# Patient Record
Sex: FEMALE | Race: OTHER
[De-identification: names, ages, dates, MRNs, and addresses within clinical notes are randomized per-mention and may not be internally consistent; named-entity substitution may affect disease eponyms.]

---

## 2017-03-31 ENCOUNTER — RECORD ABSTRACTING (OUTPATIENT)
Age: 37
End: 2017-03-31

## 2017-05-10 ENCOUNTER — APPOINTMENT (OUTPATIENT)
Dept: VASCULAR SURGERY | Facility: CLINIC | Age: 37
End: 2017-05-10

## 2017-05-10 VITALS
DIASTOLIC BLOOD PRESSURE: 68 MMHG | BODY MASS INDEX: 29.23 KG/M2 | SYSTOLIC BLOOD PRESSURE: 118 MMHG | WEIGHT: 145 LBS | HEIGHT: 59 IN

## 2017-06-08 ENCOUNTER — OUTPATIENT (OUTPATIENT)
Dept: OUTPATIENT SERVICES | Facility: HOSPITAL | Age: 37
LOS: 1 days | Discharge: HOME | End: 2017-06-08

## 2017-06-08 ENCOUNTER — APPOINTMENT (OUTPATIENT)
Dept: INTERNAL MEDICINE | Facility: CLINIC | Age: 37
End: 2017-06-08

## 2017-06-08 VITALS
HEIGHT: 59 IN | DIASTOLIC BLOOD PRESSURE: 75 MMHG | WEIGHT: 142 LBS | SYSTOLIC BLOOD PRESSURE: 122 MMHG | HEART RATE: 58 BPM | BODY MASS INDEX: 28.63 KG/M2 | RESPIRATION RATE: 17 BRPM

## 2017-06-08 RX ORDER — FLUCONAZOLE 150 MG/1
150 TABLET ORAL
Qty: 5 | Refills: 0 | Status: DISCONTINUED | COMMUNITY
Start: 2017-05-22

## 2017-06-13 LAB
ALBUMIN SERPL-MCNC: 4.6 G/DL
ALBUMIN/GLOB SERPL: 1.64
ALP SERPL-CCNC: 48 IU/L
ALT SERPL-CCNC: 21 IU/L
ANION GAP SERPL CALC-SCNC: 8 MEQ/L
APPEARANCE UR: CLEAR
AST SERPL-CCNC: 23 IU/L
BASOPHILS # BLD: 0.04 TH/MM3
BASOPHILS NFR BLD: 0.6 %
BILIRUB SERPL-MCNC: 1.1 MG/DL
BILIRUB UR QL STRIP: NEGATIVE
BUN SERPL-MCNC: 8 MG/DL
BUN/CREAT SERPL: 10.7 %
CALCIUM SERPL-MCNC: 9.4 MG/DL
CHLORIDE SERPL-SCNC: 103 MEQ/L
CHOLEST SERPL-MCNC: 212 MG/DL
CO2 SERPL-SCNC: 26 MEQ/L
COLOR UR: YELLOW
CREAT SERPL-MCNC: 0.75 MG/DL
DIFFERENTIAL METHOD BLD: NORMAL
EOSINOPHIL # BLD: 0.28 TH/MM3
EOSINOPHIL NFR BLD: 4.5 %
ERYTHROCYTE [DISTWIDTH] IN BLOOD BY AUTOMATED COUNT: 14.9 %
ESTIMATED AVERGAGE GLUCOSE (NORTH): 117 MG/DL
GFR SERPL CREATININE-BSD FRML MDRD: 88
GLUCOSE SERPL-MCNC: 74 MG/DL
GLUCOSE UR STRIP-MCNC: NEGATIVE MG/DL
GRANULOCYTES # BLD: 3.28 TH/MM3
GRANULOCYTES NFR BLD: 53.3 %
HBA1C MFR BLD: 5.7 %
HCT VFR BLD AUTO: 40 %
HDLC SERPL-MCNC: 67 MG/DL
HDLC SERPL: 3.16
HGB BLD-MCNC: 12.8 G/DL
HGB UR QL STRIP: NEGATIVE
IMM GRANULOCYTES # BLD: 0 TH/MM3
IMM GRANULOCYTES NFR BLD: 0 %
KETONES UR STRIP-MCNC: NEGATIVE MG/DL
LDLC SERPL DIRECT ASSAY-MCNC: 124 MG/DL
LYMPHOCYTES # BLD: 2.08 TH/MM3
LYMPHOCYTES NFR BLD: 33.7 %
MCH RBC QN AUTO: 28.4 PG
MCHC RBC AUTO-ENTMCNC: 32 G/DL
MCV RBC AUTO: 88.7 FL
MONOCYTES # BLD: 0.49 TH/MM3
MONOCYTES NFR BLD: 7.9 %
NITRITE UR QL STRIP: NEGATIVE
PH UR STRIP: 6.5
PLATELET # BLD: 222 TH/MM3
PMV BLD AUTO: 11.7 FL
POTASSIUM SERPL-SCNC: 4.2 MMOL/L
PROT SERPL-MCNC: 7.4 G/DL
PROT UR STRIP-MCNC: NEGATIVE MG/DL
RBC # BLD AUTO: 4.51 MIL/MM3
SODIUM SERPL-SCNC: 137 MEQ/L
SP GR UR STRIP: 1.01
TRIGL SERPL-MCNC: 57 MG/DL
TSH SERPL DL<=0.005 MIU/L-ACNC: 1.61 UIU/ML
UROBILINOGEN UR STRIP-MCNC: 0.2 MG/DL
VLDLC SERPL-MCNC: 11 MG/DL
WBC # BLD: 6.17 TH/MM3
WBC URNS QL MICRO: NEGATIVE

## 2017-06-26 ENCOUNTER — EMERGENCY (EMERGENCY)
Facility: HOSPITAL | Age: 37
LOS: 0 days | Discharge: HOME | End: 2017-06-27

## 2017-06-26 DIAGNOSIS — N89.8 OTHER SPECIFIED NONINFLAMMATORY DISORDERS OF VAGINA: ICD-10-CM

## 2017-06-28 DIAGNOSIS — Z00.01 ENCOUNTER FOR GENERAL ADULT MEDICAL EXAMINATION WITH ABNORMAL FINDINGS: ICD-10-CM

## 2017-06-28 DIAGNOSIS — R10.2 PELVIC AND PERINEAL PAIN: ICD-10-CM

## 2017-08-14 ENCOUNTER — APPOINTMENT (OUTPATIENT)
Dept: OBGYN | Facility: CLINIC | Age: 37
End: 2017-08-14

## 2017-08-14 ENCOUNTER — OUTPATIENT (OUTPATIENT)
Dept: OUTPATIENT SERVICES | Facility: HOSPITAL | Age: 37
LOS: 1 days | Discharge: HOME | End: 2017-08-14

## 2017-08-14 VITALS — SYSTOLIC BLOOD PRESSURE: 100 MMHG | WEIGHT: 143 LBS | BODY MASS INDEX: 28.88 KG/M2 | DIASTOLIC BLOOD PRESSURE: 60 MMHG

## 2017-08-16 ENCOUNTER — RESULT REVIEW (OUTPATIENT)
Age: 37
End: 2017-08-16

## 2017-08-22 LAB — HPV I/H RISK 1 DNA CVX QL PROBE+SIG AMP: NOT DETECTED

## 2017-08-23 DIAGNOSIS — Z01.419 ENCOUNTER FOR GYNECOLOGICAL EXAMINATION (GENERAL) (ROUTINE) WITHOUT ABNORMAL FINDINGS: ICD-10-CM

## 2017-11-07 ENCOUNTER — OUTPATIENT (OUTPATIENT)
Dept: OUTPATIENT SERVICES | Facility: HOSPITAL | Age: 37
LOS: 1 days | Discharge: HOME | End: 2017-11-07

## 2018-04-24 ENCOUNTER — APPOINTMENT (OUTPATIENT)
Dept: INTERNAL MEDICINE | Facility: CLINIC | Age: 38
End: 2018-04-24

## 2018-12-13 ENCOUNTER — APPOINTMENT (OUTPATIENT)
Dept: INTERNAL MEDICINE | Facility: CLINIC | Age: 38
End: 2018-12-13

## 2019-02-17 ENCOUNTER — EMERGENCY (EMERGENCY)
Facility: HOSPITAL | Age: 39
LOS: 0 days | Discharge: LEFT BEFORE DISCHARGE | End: 2019-02-18
Attending: EMERGENCY MEDICINE | Admitting: EMERGENCY MEDICINE

## 2019-02-17 VITALS
RESPIRATION RATE: 20 BRPM | HEART RATE: 67 BPM | SYSTOLIC BLOOD PRESSURE: 116 MMHG | OXYGEN SATURATION: 98 % | DIASTOLIC BLOOD PRESSURE: 56 MMHG | HEIGHT: 64 IN | TEMPERATURE: 98 F | WEIGHT: 149.91 LBS

## 2019-02-17 DIAGNOSIS — R10.9 UNSPECIFIED ABDOMINAL PAIN: ICD-10-CM

## 2019-02-21 ENCOUNTER — OUTPATIENT (OUTPATIENT)
Dept: OUTPATIENT SERVICES | Facility: HOSPITAL | Age: 39
LOS: 1 days | Discharge: HOME | End: 2019-02-21

## 2019-02-21 ENCOUNTER — APPOINTMENT (OUTPATIENT)
Dept: OBGYN | Facility: CLINIC | Age: 39
End: 2019-02-21

## 2019-02-21 VITALS
SYSTOLIC BLOOD PRESSURE: 120 MMHG | DIASTOLIC BLOOD PRESSURE: 78 MMHG | BODY MASS INDEX: 30.84 KG/M2 | WEIGHT: 153 LBS | HEIGHT: 59 IN

## 2019-02-21 DIAGNOSIS — Z01.419 ENCOUNTER FOR GYNECOLOGICAL EXAMINATION (GENERAL) (ROUTINE) WITHOUT ABNORMAL FINDINGS: ICD-10-CM

## 2019-02-21 NOTE — PHYSICAL EXAM
[Awake] : awake [Alert] : alert [Acute Distress] : no acute distress [LAD] : no lymphadenopathy [Thyroid Nodule] : no thyroid nodule [Goiter] : no goiter [Mass] : no breast mass [Nipple Discharge] : no nipple discharge [Axillary LAD] : no axillary lymphadenopathy [Soft] : soft [Tender] : non tender [Distended] : not distended [Abdomen Hernia Umbilical] : an umbilical hernia was present [] : which was reducible [No Lesions] : no genitalia lesions [Normal] : cervix [Discharge] : had no discharge [Motion Tenderness] : there was no cervical motion tenderness [FreeTextEntry7] : 10 week anteverted, no adnexal masses/tenderness

## 2019-02-21 NOTE — HISTORY OF PRESENT ILLNESS
[___ Year(s) Ago] : [unfilled] year(s) ago [Good] : being in good health [Last Pap ___] : Last cervical pap smear was [unfilled] [Reproductive Age] : is of reproductive age [Definite:  ___ (Date)] : the last menstrual period was [unfilled] [Normal Amount/Duration] : was of a normal amount and duration [Spotting Between  Menses] : no spotting between menses [Regular Cycle Intervals] : periods have been regular [Sexually Active] : is sexually active [Contraception] : uses contraception [Tubal Ligation] : has had a tubal ligation

## 2019-04-24 ENCOUNTER — OUTPATIENT (OUTPATIENT)
Dept: OUTPATIENT SERVICES | Facility: HOSPITAL | Age: 39
LOS: 1 days | Discharge: HOME | End: 2019-04-24

## 2019-04-24 ENCOUNTER — APPOINTMENT (OUTPATIENT)
Dept: INTERNAL MEDICINE | Facility: CLINIC | Age: 39
End: 2019-04-24

## 2019-04-24 VITALS
BODY MASS INDEX: 30.64 KG/M2 | SYSTOLIC BLOOD PRESSURE: 128 MMHG | TEMPERATURE: 97.5 F | HEART RATE: 66 BPM | HEIGHT: 59 IN | DIASTOLIC BLOOD PRESSURE: 65 MMHG | WEIGHT: 152 LBS

## 2019-04-24 DIAGNOSIS — R10.2 PELVIC AND PERINEAL PAIN: ICD-10-CM

## 2019-04-24 RX ORDER — ACETAMINOPHEN 325 MG/1
325 TABLET, FILM COATED ORAL EVERY 6 HOURS
Qty: 50 | Refills: 0 | Status: DISCONTINUED | COMMUNITY
Start: 2017-06-08 | End: 2019-04-24

## 2019-04-24 NOTE — PHYSICAL EXAM
[No Acute Distress] : no acute distress [Well Nourished] : well nourished [Well Developed] : well developed [Well-Appearing] : well-appearing [Normal Voice/Communication] : normal voice/communication [Normal Sclera/Conjunctiva] : normal sclera/conjunctiva [No Respiratory Distress] : no respiratory distress  [Clear to Auscultation] : lungs were clear to auscultation bilaterally [No Accessory Muscle Use] : no accessory muscle use [Regular Rhythm] : with a regular rhythm [Normal Rate] : normal rate  [Normal S1, S2] : normal S1 and S2 [No Edema] : there was no peripheral edema [Soft] : abdomen soft [Non Tender] : non-tender [Normal Bowel Sounds] : normal bowel sounds [No Rash] : no rash [Speech Grossly Normal] : speech grossly normal [Normal Mood] : the mood was normal [Normal Insight/Judgement] : insight and judgment were intact [de-identified] : umbiclical hernia - no signs of strangulation or incarceration, non-tender, reducibile, no change in color

## 2019-04-24 NOTE — HISTORY OF PRESENT ILLNESS
[[employer] ] : provided by [employer]   [FreeTextEntry1] : [id number] [FreeTextEntry2] : Ruth  [de-identified] : [employer] interpretor: Ruth [id number]\par \par 39/F presents to clinic for routine physical exam. \par Reports having a "ball/mass" at umbilicus - no pain now, has intermittent pain at site, \par No other complaints.\par

## 2019-04-24 NOTE — ASSESSMENT
[FreeTextEntry1] : 39/F presents to clinic for annual physical exam.\par \par # Umbilical hernia - no signs of incarceration / strangulation\par - Discussed in length what hernia is, referred to general surgery for evaluation\par \par # Leg varicose vein - continue compression stockings; f/u with vascular surgery\par \par # HCM Females:\par -Routine labs: CBC & CMP & TSH & Vit D & Lipid profile & A1c\par -Pap/HPV - UTD next 2021\par -HIV screening\par

## 2019-04-25 ENCOUNTER — APPOINTMENT (OUTPATIENT)
Dept: OBGYN | Facility: CLINIC | Age: 39
End: 2019-04-25

## 2019-05-07 DIAGNOSIS — K42.9 UMBILICAL HERNIA WITHOUT OBSTRUCTION OR GANGRENE: ICD-10-CM

## 2019-05-07 DIAGNOSIS — I83.819 VARICOSE VEINS OF UNSPECIFIED LOWER EXTREMITY WITH PAIN: ICD-10-CM

## 2019-05-07 LAB
ALBUMIN SERPL ELPH-MCNC: 4.1 G/DL
ALP BLD-CCNC: 63 U/L
ALT SERPL-CCNC: 27 U/L
ANION GAP SERPL CALC-SCNC: 9 MMOL/L
AST SERPL-CCNC: 20 U/L
BASOPHILS # BLD AUTO: 0.04 K/UL
BASOPHILS NFR BLD AUTO: 0.5 %
BILIRUB SERPL-MCNC: 0.4 MG/DL
BUN SERPL-MCNC: 12 MG/DL
CALCIUM SERPL-MCNC: 8.8 MG/DL
CHLORIDE SERPL-SCNC: 103 MMOL/L
CHOLEST SERPL-MCNC: 174 MG/DL
CHOLEST/HDLC SERPL: 2.9 RATIO
CO2 SERPL-SCNC: 26 MMOL/L
CREAT SERPL-MCNC: 0.7 MG/DL
EOSINOPHIL # BLD AUTO: 0.22 K/UL
EOSINOPHIL NFR BLD AUTO: 2.6 %
ESTIMATED AVERAGE GLUCOSE: 111 MG/DL
GLUCOSE SERPL-MCNC: 88 MG/DL
HBA1C MFR BLD HPLC: 5.5 %
HCT VFR BLD CALC: 39.5 %
HDLC SERPL-MCNC: 59 MG/DL
HGB BLD-MCNC: 12.9 G/DL
IMM GRANULOCYTES NFR BLD AUTO: 0.2 %
LDLC SERPL CALC-MCNC: 113 MG/DL
LYMPHOCYTES # BLD AUTO: 2.17 K/UL
LYMPHOCYTES NFR BLD AUTO: 25.7 %
MAN DIFF?: NORMAL
MCHC RBC-ENTMCNC: 29.9 PG
MCHC RBC-ENTMCNC: 32.7 G/DL
MCV RBC AUTO: 91.4 FL
MONOCYTES # BLD AUTO: 0.53 K/UL
MONOCYTES NFR BLD AUTO: 6.3 %
NEUTROPHILS # BLD AUTO: 5.46 K/UL
NEUTROPHILS NFR BLD AUTO: 64.7 %
PLATELET # BLD AUTO: 231 K/UL
POTASSIUM SERPL-SCNC: 4.5 MMOL/L
PROT SERPL-MCNC: 6.8 G/DL
RBC # BLD: 4.32 M/UL
RBC # FLD: 14.2 %
SODIUM SERPL-SCNC: 138 MMOL/L
TRIGL SERPL-MCNC: 57 MG/DL
WBC # FLD AUTO: 8.44 K/UL

## 2019-05-08 LAB
25(OH)D3 SERPL-MCNC: 13 NG/ML
HIV1+2 AB SPEC QL IA.RAPID: NONREACTIVE
TSH SERPL-ACNC: 1.34 UIU/ML

## 2019-05-14 ENCOUNTER — APPOINTMENT (OUTPATIENT)
Dept: SURGERY | Facility: CLINIC | Age: 39
End: 2019-05-14
Payer: SUBSIDIZED

## 2019-05-14 VITALS
HEIGHT: 59 IN | WEIGHT: 150 LBS | DIASTOLIC BLOOD PRESSURE: 72 MMHG | BODY MASS INDEX: 30.24 KG/M2 | SYSTOLIC BLOOD PRESSURE: 110 MMHG

## 2019-05-14 PROCEDURE — 99203 OFFICE O/P NEW LOW 30 MIN: CPT

## 2019-05-14 NOTE — REASON FOR VISIT
[Initial Evaluation] : an initial evaluation [FreeTextEntry1] : for reducible umbilical hernia that increased in size and now painful at times  [Spouse] : spouse

## 2019-05-14 NOTE — PHYSICAL EXAM
[JVD] : no jugular venous distention  [Normal Breath Sounds] : Normal breath sounds [Abdominal Masses] : No abdominal masses [Abdomen Tenderness] : ~T ~M No abdominal tenderness [No Rash or Lesion] : No rash or lesion [No HSM] : no hepatosplenomegaly [Oriented to Person] : oriented to person [Oriented to Place] : oriented to place [Alert] : alert [Calm] : calm [Oriented to Time] : oriented to time [de-identified] : MELANIE [de-identified] : appears well  [de-identified] : 5 cm umbilical hernia, reducible with no incarceration or strangulation

## 2019-05-14 NOTE — ASSESSMENT
[FreeTextEntry1] : reducible umbilical hernia \par patient to decide about surgery \par f/u as needed

## 2019-05-20 ENCOUNTER — APPOINTMENT (OUTPATIENT)
Dept: VASCULAR SURGERY | Facility: CLINIC | Age: 39
End: 2019-05-20
Payer: SUBSIDIZED

## 2019-05-20 VITALS
SYSTOLIC BLOOD PRESSURE: 112 MMHG | BODY MASS INDEX: 30.24 KG/M2 | DIASTOLIC BLOOD PRESSURE: 70 MMHG | HEIGHT: 59 IN | WEIGHT: 150 LBS

## 2019-05-20 PROCEDURE — 99213 OFFICE O/P EST LOW 20 MIN: CPT

## 2019-05-20 NOTE — PHYSICAL EXAM
[2+] : left 2+ [Ankle Swelling (On Exam)] : not present [Varicose Veins Of Lower Extremities] : present [Varicose Veins Of The Right Leg] : of the right leg [] : of the right leg [Ankle Swelling On The Left] : moderate [FreeTextEntry1] : VV right posterior thigh and behind the knee, tender. No ulcers

## 2019-05-20 NOTE — ASSESSMENT
[FreeTextEntry1] : The patient is a 39-year-old female with symptomatic right posterior thigh and popliteal fossa varicosities. I offered the patient micro stab phlebectomy. However she would like to think about it and continue with conservative management at this time and would let us know if she wishes to proceed with surgery.

## 2019-05-20 NOTE — HISTORY OF PRESENT ILLNESS
[FreeTextEntry1] : The patient is a 39-year-old female with symptomatic right posterior popliteal fossa varicose veins. The patient was seen in my office year ago and was recommended compression stockings. The patient presents with her son and her  states that she still has symptoms despite compression stocking therapy. The patient had a venous duplex performed previously there was no evidence of greater saphenous vein incompetency or DVT.

## 2019-07-16 ENCOUNTER — APPOINTMENT (OUTPATIENT)
Dept: SURGERY | Facility: CLINIC | Age: 39
End: 2019-07-16

## 2019-08-20 ENCOUNTER — APPOINTMENT (OUTPATIENT)
Dept: SURGERY | Facility: CLINIC | Age: 39
End: 2019-08-20
Payer: COMMERCIAL

## 2019-08-20 VITALS
BODY MASS INDEX: 30.64 KG/M2 | SYSTOLIC BLOOD PRESSURE: 124 MMHG | HEIGHT: 59 IN | DIASTOLIC BLOOD PRESSURE: 76 MMHG | WEIGHT: 152 LBS

## 2019-08-20 PROCEDURE — 99213 OFFICE O/P EST LOW 20 MIN: CPT

## 2019-08-20 NOTE — PHYSICAL EXAM
[JVD] : no jugular venous distention  [Normal Breath Sounds] : Normal breath sounds [Normal Heart Sounds] : normal heart sounds [Abdominal Masses] : No abdominal masses [Abdomen Tenderness] : ~T ~M No abdominal tenderness [No HSM] : no hepatosplenomegaly [No Rash or Lesion] : No rash or lesion [Tender] : was nontender [Alert] : alert [Oriented to Person] : oriented to person [Oriented to Time] : oriented to time [Oriented to Place] : oriented to place [Calm] : calm [de-identified] : c/o umbilical pain  [de-identified] : MELANIE [de-identified] : 3 cm umbilical bulge , non reducible , no obstructive symptoms and no skin changes

## 2019-08-20 NOTE — HISTORY OF PRESENT ILLNESS
[de-identified] : was seen few months ago for incarcerated umbilical hernia , returns c/o increasing size and fervency of pain

## 2019-08-22 ENCOUNTER — EMERGENCY (EMERGENCY)
Facility: HOSPITAL | Age: 39
LOS: 0 days | Discharge: HOME | End: 2019-08-22
Admitting: EMERGENCY MEDICINE
Payer: SUBSIDIZED

## 2019-08-22 VITALS
TEMPERATURE: 98 F | OXYGEN SATURATION: 98 % | DIASTOLIC BLOOD PRESSURE: 66 MMHG | RESPIRATION RATE: 18 BRPM | HEART RATE: 69 BPM | SYSTOLIC BLOOD PRESSURE: 133 MMHG

## 2019-08-22 DIAGNOSIS — N89.8 OTHER SPECIFIED NONINFLAMMATORY DISORDERS OF VAGINA: ICD-10-CM

## 2019-08-22 DIAGNOSIS — N76.0 ACUTE VAGINITIS: ICD-10-CM

## 2019-08-22 LAB
APPEARANCE UR: CLEAR — SIGNIFICANT CHANGE UP
BILIRUB UR-MCNC: NEGATIVE — SIGNIFICANT CHANGE UP
COLOR SPEC: SIGNIFICANT CHANGE UP
DIFF PNL FLD: SIGNIFICANT CHANGE UP
GLUCOSE UR QL: NEGATIVE — SIGNIFICANT CHANGE UP
KETONES UR-MCNC: NEGATIVE — SIGNIFICANT CHANGE UP
LEUKOCYTE ESTERASE UR-ACNC: NEGATIVE — SIGNIFICANT CHANGE UP
NITRITE UR-MCNC: NEGATIVE — SIGNIFICANT CHANGE UP
PH UR: 6.5 — SIGNIFICANT CHANGE UP (ref 5–8)
PROT UR-MCNC: NEGATIVE — SIGNIFICANT CHANGE UP
SP GR SPEC: 1.01 — SIGNIFICANT CHANGE UP (ref 1.01–1.02)
UROBILINOGEN FLD QL: SIGNIFICANT CHANGE UP

## 2019-08-22 PROCEDURE — 99284 EMERGENCY DEPT VISIT MOD MDM: CPT

## 2019-08-22 RX ORDER — METRONIDAZOLE 500 MG
1 TABLET ORAL
Qty: 14 | Refills: 0
Start: 2019-08-22 | End: 2019-08-28

## 2019-08-22 RX ORDER — FLUCONAZOLE 150 MG/1
150 TABLET ORAL ONCE
Refills: 0 | Status: COMPLETED | OUTPATIENT
Start: 2019-08-22 | End: 2019-08-22

## 2019-08-22 RX ADMIN — FLUCONAZOLE 150 MILLIGRAM(S): 150 TABLET ORAL at 23:13

## 2019-08-22 NOTE — ED PROVIDER NOTE - CLINICAL SUMMARY MEDICAL DECISION MAKING FREE TEXT BOX
Pt with vaginal itching and dysuria x 1 week. States history of similar issue in the past was told it was yeast infection. [facility], foul odor white discharge, dysuria x 1 week. States history of similar issue in the past was told it was yeast infection. States  had fungal infection. UA neg. Urine culture sent. Tested for STDs. Will treat for yeast infection and bacterial vaginitis. [facility]. I have discussed the discharge plan with the patient. The patient agrees with the plan, as discussed.  The patient understands Emergency Department diagnosis is a preliminary diagnosis often based on limited information and that the patient must adhere to the follow-up plan as discussed.  The patient understands that if the symptoms worsen or if prescribed medications do not have the desired/planned effect that the patient may return to the Emergency Department at any time for further evaluation and treatment.

## 2019-08-22 NOTE — ED PROVIDER NOTE - NS ED ROS FT
Review of Systems:  	•	CONSTITUTIONAL - no fever, no diaphoresis, no chills  	•	SKIN - no rash  	•	HEMATOLOGIC - no bleeding, no bruising  	•	EYES - no eye pain, no blurry vision  	•	ENT - no congestion  	•	RESPIRATORY - no shortness of breath, no cough  	•	CARDIAC - no chest pain, no palpitations  	•	GI - no abd pain, no nausea, no vomiting, no diarrhea, no constipation  	•	GENITO-URINARY - +vaginal itching, + dysuria; no hematuria, no increased urinary frequency  	•	MUSCULOSKELETAL - no joint paint, no swelling, no redness  	•	NEUROLOGIC - no weakness, no headache, no paresthesias, no LOC   	All other ROS are negative except as documented in HPI.

## 2019-08-22 NOTE — ED PROVIDER NOTE - NSFOLLOWUPINSTRUCTIONS_ED_ALL_ED_FT
Candidiasis vaginal en los adultos  Vaginal Yeast infection, Adult  Introducción  Image   La candidiasis vaginal es lloyd afección que causa dolor, hinchazón y enrojecimiento (inflamación) de la vagina. También causa secreción vaginal. Esta es lloyd enfermedad frecuente. Algunas mujeres contraen esta infección con frecuencia.    ¿Cuáles son las causas?  La causa de la infección es un cambio en el equilibrio normal de los hongos (cándida) y las bacterias que viven en la vagina. Esta alteración deriva en el crecimiento excesivo de los hongos, lo que causa la inflamación.    ¿Qué incrementa el riesgo?  Es más probable que esta afección se manifieste en:  Las mujeres que clarence antibióticos.  Las mujeres que tienen diabetes.  Las mujeres que clarence anticonceptivos.  Las mujeres que están embarazadas.  Las mujeres que se hacen duchas vaginales con frecuencia.  Las mujeres que tienen un sistema de defensa (inmunitario) débil.  Las mujeres que kim tomado corticoides jocelyne mucho tiempo.  Las mujeres que usan ropa ajustada con frecuencia.  ¿Cuáles son los signos o los síntomas?  Los síntomas de esta afección incluyen lo siguiente:  Secreción vaginal gio y espesa.  Hinchazón, picazón, enrojecimiento e irritación de la vagina. Los labios de la vagina (vulva) también se pueden infectar.  Dolor o ardor al orinar.  Dolor jocelyne las relaciones sexuales.  ¿Cómo se diagnostica?  Esta afección se diagnostica mediante los antecedentes médicos y un examen físico. Anni incluye un examen pélvico. El médico examinará lloyd muestra de la secreción vaginal con un microscopio. Probablemente el médico envíe esta muestra al laboratorio para analizarla y confirmar el diagnóstico.    ¿Cómo se trata?  Esta afección se trata con medicamentos. Los medicamentos pueden ser recetados o de venta charmaine. Podrán indicarle que use jaya o más de lo siguiente:  Medicamentos por vía oral.  Medicamentos que se aplican riccardo lloyd crema.  Medicamentos que se colocan directamente en la vagina (óvulos vaginales).  Siga estas indicaciones en garnett casa:  [place] o aplíquese los medicamentos de venta charmaine y recetados solamente riccardo se lo haya indicado el médico.  No tenga relaciones sexuales hasta que el médico lo autorice. Comunique a garnett compañero sexual que tiene lloyd infección por hongos. Esas personas deben consultar al médico si tienen síntomas.  No use ropa ajustada, riccardo pantis o pantalones ajustados.  Evite el uso de tampones hasta que el médico lo autorice.  Consuma más yogur. Winnie puede ayudar a evitar la recurrencia de la candidiasis.  Intente darse un baño de asiento para aliviar las molestias. Se trata de un baño de agua tibia que se lissy mientras se está sentado. El agua solo debe llegar hasta las caderas y cubrir las nalgas. Hágalo 3 o 4 veces al día o riccardo se lo haya indicado el médico.  No se amy duchas vaginales.  Use ropa interior transpirable de algodón.  Si tiene diabetes, mantenga bajo control los niveles de azúcar en la cristobal.  Comuníquese con un médico si:  Tiene fiebre.  Los síntomas desaparecen y luego vuelven a aparecer.  Los síntomas no mejoran con el tratamiento.  Los síntomas empeoran.  Aparecen nuevos síntomas.  Aparecen ampollas alrededor o adentro de la vagina.  Le sale cristobal de la vagina y no está menstruando.  Siente dolor en el abdomen.  Esta información no tiene riccardo fin reemplazar el consejo del médico. Asegúrese de hacerle al médico cualquier pregunta que tenga.

## 2019-08-22 NOTE — ED ADULT NURSE NOTE - OBJECTIVE STATEMENT
Pt presents to ED with complaints of vaginal itching. Pt presents to ED with complaints of vaginal itching. No complaints of dysuria or hematuria. Patient denies any abdominal pain or discomfort. Denies any fevers.

## 2019-08-22 NOTE — ED PROVIDER NOTE - NSFOLLOWUPCLINICS_GEN_ALL_ED_FT
[facility]  OB/GYN  [address] [place] [address]  Phone: [phone]  Fax:   Follow Up Time: 1-3 Days

## 2019-08-22 NOTE — ED PROVIDER NOTE - OBJECTIVE STATEMENT
Shane [id number]    40 yo F with no pmhx presenting with vaginal itching and burning x 1 week. Symptoms are moderate. Associated with white vaginal odorous discharge. States she is sexually active with 1 partner. States her  recently had fungal infection. No concern for STDs but would still like to get tested. No abdominal pain, back pain, fever, chills, or vaginal bleeding. Reports dysuria. No hematuria. No rash. LMP Aug 1.

## 2019-08-22 NOTE — ED PROVIDER NOTE - PHYSICAL EXAMINATION
VITAL SIGNS: I have reviewed nursing notes and confirm.  CONSTITUTIONAL: Well-developed; well-nourished; in no acute distress.  SKIN: Skin exam is warm and dry, no acute rash.  HEAD: Normocephalic; atraumatic.  EYES: PERRL, EOM intact; conjunctiva and sclera clear.  ENT: No nasal discharge; airway clear.   NECK: Supple; non tender.  CARD: S1, S2 normal; no murmurs, gallops, or rubs. Regular rate and rhythm.  RESP: Clear to auscultation bilaterally. No wheezes, rales or rhonchi.  ABD: Normal bowel sounds; soft; non-distended; non-tender. No CVA tenderness  : External genitalia WNL, without lesions. mucosa pink and moist. No lesions in vaginal canal/on cervix. cervical os closed with white discharge. no CMT, adnexal fullness, adnexal TTP.   EXT: Normal ROM. No edema.  LYMPH: No acute cervical adenopathy.  NEURO: Alert, oriented. Grossly unremarkable. No focal deficits.  PSYCH: Cooperative, appropriate.

## 2019-08-23 LAB
C TRACH RRNA SPEC QL NAA+PROBE: SIGNIFICANT CHANGE UP
N GONORRHOEA RRNA SPEC QL NAA+PROBE: SIGNIFICANT CHANGE UP
SPECIMEN SOURCE: SIGNIFICANT CHANGE UP

## 2019-08-24 LAB
CULTURE RESULTS: SIGNIFICANT CHANGE UP
SPECIMEN SOURCE: SIGNIFICANT CHANGE UP

## 2019-08-26 RX ORDER — FLUCONAZOLE 150 MG/1
1 TABLET ORAL
Qty: 1 | Refills: 0
Start: 2019-08-26 | End: 2019-08-26

## 2019-08-26 RX ORDER — CEFUROXIME AXETIL 250 MG
1 TABLET ORAL
Qty: 14 | Refills: 0
Start: 2019-08-26 | End: 2019-09-01

## 2019-08-29 ENCOUNTER — OUTPATIENT (OUTPATIENT)
Dept: OUTPATIENT SERVICES | Facility: HOSPITAL | Age: 39
LOS: 1 days | Discharge: HOME | End: 2019-08-29

## 2019-08-29 VITALS
RESPIRATION RATE: 16 BRPM | OXYGEN SATURATION: 100 % | HEIGHT: 55 IN | TEMPERATURE: 98 F | DIASTOLIC BLOOD PRESSURE: 76 MMHG | HEART RATE: 70 BPM | WEIGHT: 151.9 LBS | SYSTOLIC BLOOD PRESSURE: 122 MMHG

## 2019-08-29 DIAGNOSIS — K42.9 UMBILICAL HERNIA WITHOUT OBSTRUCTION OR GANGRENE: ICD-10-CM

## 2019-08-29 DIAGNOSIS — Z98.891 HISTORY OF UTERINE SCAR FROM PREVIOUS SURGERY: Chronic | ICD-10-CM

## 2019-08-29 DIAGNOSIS — Z01.818 ENCOUNTER FOR OTHER PREPROCEDURAL EXAMINATION: ICD-10-CM

## 2019-08-29 DIAGNOSIS — Z98.51 TUBAL LIGATION STATUS: Chronic | ICD-10-CM

## 2019-08-29 LAB
ALBUMIN SERPL ELPH-MCNC: 4.8 G/DL — SIGNIFICANT CHANGE UP (ref 3.5–5.2)
ALP SERPL-CCNC: 65 U/L — SIGNIFICANT CHANGE UP (ref 30–115)
ALT FLD-CCNC: 23 U/L — SIGNIFICANT CHANGE UP (ref 0–41)
ANION GAP SERPL CALC-SCNC: 12 MMOL/L — SIGNIFICANT CHANGE UP (ref 7–14)
AST SERPL-CCNC: 20 U/L — SIGNIFICANT CHANGE UP (ref 0–41)
BASOPHILS # BLD AUTO: 0.05 K/UL — SIGNIFICANT CHANGE UP (ref 0–0.2)
BASOPHILS NFR BLD AUTO: 0.6 % — SIGNIFICANT CHANGE UP (ref 0–1)
BILIRUB SERPL-MCNC: 0.5 MG/DL — SIGNIFICANT CHANGE UP (ref 0.2–1.2)
BUN SERPL-MCNC: 11 MG/DL — SIGNIFICANT CHANGE UP (ref 10–20)
CALCIUM SERPL-MCNC: 9.6 MG/DL — SIGNIFICANT CHANGE UP (ref 8.5–10.1)
CHLORIDE SERPL-SCNC: 104 MMOL/L — SIGNIFICANT CHANGE UP (ref 98–110)
CO2 SERPL-SCNC: 25 MMOL/L — SIGNIFICANT CHANGE UP (ref 17–32)
CREAT SERPL-MCNC: 0.8 MG/DL — SIGNIFICANT CHANGE UP (ref 0.7–1.5)
EOSINOPHIL # BLD AUTO: 0.31 K/UL — SIGNIFICANT CHANGE UP (ref 0–0.7)
EOSINOPHIL NFR BLD AUTO: 3.6 % — SIGNIFICANT CHANGE UP (ref 0–8)
GLUCOSE SERPL-MCNC: 86 MG/DL — SIGNIFICANT CHANGE UP (ref 70–99)
HCT VFR BLD CALC: 42.6 % — SIGNIFICANT CHANGE UP (ref 37–47)
HGB BLD-MCNC: 13.9 G/DL — SIGNIFICANT CHANGE UP (ref 12–16)
IMM GRANULOCYTES NFR BLD AUTO: 0.2 % — SIGNIFICANT CHANGE UP (ref 0.1–0.3)
LYMPHOCYTES # BLD AUTO: 2.16 K/UL — SIGNIFICANT CHANGE UP (ref 1.2–3.4)
LYMPHOCYTES # BLD AUTO: 25.3 % — SIGNIFICANT CHANGE UP (ref 20.5–51.1)
MCHC RBC-ENTMCNC: 29.6 PG — SIGNIFICANT CHANGE UP (ref 27–31)
MCHC RBC-ENTMCNC: 32.6 G/DL — SIGNIFICANT CHANGE UP (ref 32–37)
MCV RBC AUTO: 90.6 FL — SIGNIFICANT CHANGE UP (ref 81–99)
MONOCYTES # BLD AUTO: 0.7 K/UL — HIGH (ref 0.1–0.6)
MONOCYTES NFR BLD AUTO: 8.2 % — SIGNIFICANT CHANGE UP (ref 1.7–9.3)
NEUTROPHILS # BLD AUTO: 5.31 K/UL — SIGNIFICANT CHANGE UP (ref 1.4–6.5)
NEUTROPHILS NFR BLD AUTO: 62.1 % — SIGNIFICANT CHANGE UP (ref 42.2–75.2)
NRBC # BLD: 0 /100 WBCS — SIGNIFICANT CHANGE UP (ref 0–0)
PLATELET # BLD AUTO: 233 K/UL — SIGNIFICANT CHANGE UP (ref 130–400)
POTASSIUM SERPL-MCNC: 4.3 MMOL/L — SIGNIFICANT CHANGE UP (ref 3.5–5)
POTASSIUM SERPL-SCNC: 4.3 MMOL/L — SIGNIFICANT CHANGE UP (ref 3.5–5)
PROT SERPL-MCNC: 7.7 G/DL — SIGNIFICANT CHANGE UP (ref 6–8)
RBC # BLD: 4.7 M/UL — SIGNIFICANT CHANGE UP (ref 4.2–5.4)
RBC # FLD: 13.3 % — SIGNIFICANT CHANGE UP (ref 11.5–14.5)
SODIUM SERPL-SCNC: 141 MMOL/L — SIGNIFICANT CHANGE UP (ref 135–146)
WBC # BLD: 8.55 K/UL — SIGNIFICANT CHANGE UP (ref 4.8–10.8)
WBC # FLD AUTO: 8.55 K/UL — SIGNIFICANT CHANGE UP (ref 4.8–10.8)

## 2019-08-29 NOTE — H&P PST ADULT - HISTORY OF PRESENT ILLNESS
39yr old female states found a small bulge in the umbilical area about 2m ago, also has been having mild discomfort in the umbilical area with strenuous activities -presents to pretesting for umbilical hernia repair with mesh. Denies c/o ABD PAIN , N/V, CP, PALP, SOB, URI, FEVER, RASH, BLOOD IN THE URINE , PAINFUL URINATION, URGENCY OR FREQUENCY URINATING ( SEE UA AND C/S RESULTS 8/2019, WILL COMPLETE ABX IN 4 DAYS, HENCE URINE NOT REPEATED). Exercise gabriela 4-5 FOS.

## 2019-08-29 NOTE — H&P PST ADULT - NSANTHTIREDRD_ENT_A_CORE
ID patient by name and date of birth.  Specimen collected with 23g butterfly using aseptic technique. Patient tolerated well.     No

## 2019-08-29 NOTE — H&P PST ADULT - EXTREMITIES
No cyanosis, clubbing or edema Tremfya Counseling: I discussed with the patient the risks of guselkumab including but not limited to immunosuppression, serious infections, worsening of inflammatory bowel disease and drug reactions.  The patient understands that monitoring is required including a PPD at baseline and must alert us or the primary physician if symptoms of infection or other concerning signs are noted.

## 2019-09-03 ENCOUNTER — APPOINTMENT (OUTPATIENT)
Dept: OBGYN | Facility: CLINIC | Age: 39
End: 2019-09-03
Payer: COMMERCIAL

## 2019-09-03 ENCOUNTER — OUTPATIENT (OUTPATIENT)
Dept: OUTPATIENT SERVICES | Facility: HOSPITAL | Age: 39
LOS: 1 days | Discharge: HOME | End: 2019-09-03

## 2019-09-03 VITALS
BODY MASS INDEX: 31.04 KG/M2 | DIASTOLIC BLOOD PRESSURE: 70 MMHG | SYSTOLIC BLOOD PRESSURE: 120 MMHG | WEIGHT: 154 LBS | HEIGHT: 59 IN

## 2019-09-03 DIAGNOSIS — Z98.51 TUBAL LIGATION STATUS: Chronic | ICD-10-CM

## 2019-09-03 DIAGNOSIS — Z98.891 HISTORY OF UTERINE SCAR FROM PREVIOUS SURGERY: Chronic | ICD-10-CM

## 2019-09-03 PROBLEM — N39.0 URINARY TRACT INFECTION, SITE NOT SPECIFIED: Chronic | Status: ACTIVE | Noted: 2019-08-29

## 2019-09-03 PROCEDURE — 99213 OFFICE O/P EST LOW 20 MIN: CPT

## 2019-09-06 ENCOUNTER — APPOINTMENT (OUTPATIENT)
Dept: SURGERY | Facility: AMBULATORY SURGERY CENTER | Age: 39
End: 2019-09-06

## 2019-09-06 ENCOUNTER — OUTPATIENT (OUTPATIENT)
Dept: OUTPATIENT SERVICES | Facility: HOSPITAL | Age: 39
LOS: 1 days | Discharge: HOME | End: 2019-09-06
Payer: SUBSIDIZED

## 2019-09-06 VITALS
SYSTOLIC BLOOD PRESSURE: 124 MMHG | HEART RATE: 63 BPM | RESPIRATION RATE: 16 BRPM | DIASTOLIC BLOOD PRESSURE: 60 MMHG | OXYGEN SATURATION: 99 %

## 2019-09-06 VITALS
HEART RATE: 64 BPM | RESPIRATION RATE: 18 BRPM | TEMPERATURE: 98 F | SYSTOLIC BLOOD PRESSURE: 128 MMHG | OXYGEN SATURATION: 100 % | HEIGHT: 55 IN | WEIGHT: 151.9 LBS | DIASTOLIC BLOOD PRESSURE: 72 MMHG

## 2019-09-06 DIAGNOSIS — N92.0 EXCESSIVE AND FREQUENT MENSTRUATION WITH REGULAR CYCLE: ICD-10-CM

## 2019-09-06 DIAGNOSIS — Z98.891 HISTORY OF UTERINE SCAR FROM PREVIOUS SURGERY: Chronic | ICD-10-CM

## 2019-09-06 DIAGNOSIS — Z98.51 TUBAL LIGATION STATUS: Chronic | ICD-10-CM

## 2019-09-06 PROCEDURE — 49587: CPT

## 2019-09-06 RX ORDER — MORPHINE SULFATE 50 MG/1
2 CAPSULE, EXTENDED RELEASE ORAL
Refills: 0 | Status: DISCONTINUED | OUTPATIENT
Start: 2019-09-06 | End: 2019-09-06

## 2019-09-06 RX ORDER — OXYCODONE AND ACETAMINOPHEN 5; 325 MG/1; MG/1
1 TABLET ORAL ONCE
Refills: 0 | Status: DISCONTINUED | OUTPATIENT
Start: 2019-09-06 | End: 2019-09-06

## 2019-09-06 RX ORDER — TRAMADOL HYDROCHLORIDE 50 MG/1
1 TABLET ORAL
Qty: 15 | Refills: 0
Start: 2019-09-06 | End: 2019-09-09

## 2019-09-06 RX ORDER — SODIUM CHLORIDE 9 MG/ML
1000 INJECTION, SOLUTION INTRAVENOUS
Refills: 0 | Status: DISCONTINUED | OUTPATIENT
Start: 2019-09-06 | End: 2019-09-21

## 2019-09-06 RX ORDER — ONDANSETRON 8 MG/1
4 TABLET, FILM COATED ORAL ONCE
Refills: 0 | Status: DISCONTINUED | OUTPATIENT
Start: 2019-09-06 | End: 2019-09-21

## 2019-09-06 RX ADMIN — OXYCODONE AND ACETAMINOPHEN 1 TABLET(S): 5; 325 TABLET ORAL at 14:02

## 2019-09-06 RX ADMIN — MORPHINE SULFATE 2 MILLIGRAM(S): 50 CAPSULE, EXTENDED RELEASE ORAL at 13:29

## 2019-09-06 RX ADMIN — SODIUM CHLORIDE 100 MILLILITER(S): 9 INJECTION, SOLUTION INTRAVENOUS at 13:10

## 2019-09-06 RX ADMIN — MORPHINE SULFATE 2 MILLIGRAM(S): 50 CAPSULE, EXTENDED RELEASE ORAL at 13:40

## 2019-09-06 NOTE — ASU DISCHARGE PLAN (ADULT/PEDIATRIC) - CALL YOUR DOCTOR IF YOU HAVE ANY OF THE FOLLOWING:
Swelling that gets worse/Wound/Surgical Site with redness, or foul smelling discharge or pus/Bleeding that does not stop/Pain not relieved by Medications

## 2019-09-06 NOTE — ASU DISCHARGE PLAN (ADULT/PEDIATRIC) - ASU DC SPECIAL INSTRUCTIONSFT
You are being discharged from [facility]. Please schedule a follow up appointment in Dr. Zuniga's clinic in 2 weeks. You have been prescribed tramadol for breakthrough pain relief, please take as directed. You may remove the dressing over your surgical site in 48 hours, you may shower once the dressing is removed. Please do not remove the Steri-strips underneath. Please avoid heavy weight lifting for the next 4-6 weeks. If you have any further questions about your care, please do not hesitate to contact Dr. Zuniga's clinic.

## 2019-09-06 NOTE — ASU DISCHARGE PLAN (ADULT/PEDIATRIC) - CARE PROVIDER_API CALL
Max Zuniga)  Surgery; Surgical Critical Care  [address] [place] [address], [address] [place] [address]  Phone: [phone]  Fax: [phone]  Follow Up Time: 2 weeks

## 2019-09-06 NOTE — BRIEF OPERATIVE NOTE - NSICDXBRIEFPROCEDURE_GEN_ALL_CORE_FT
PROCEDURES:  Repair, hernia, umbilical, open, adult 06-Sep-2019 13:05:10 with insertion of mesh Gaviota Patterson

## 2019-09-11 DIAGNOSIS — K42.0 UMBILICAL HERNIA WITH OBSTRUCTION, WITHOUT GANGRENE: ICD-10-CM

## 2019-09-20 ENCOUNTER — APPOINTMENT (OUTPATIENT)
Dept: SURGERY | Facility: CLINIC | Age: 39
End: 2019-09-20
Payer: COMMERCIAL

## 2019-09-20 VITALS
HEIGHT: 59 IN | DIASTOLIC BLOOD PRESSURE: 68 MMHG | WEIGHT: 150 LBS | BODY MASS INDEX: 30.24 KG/M2 | SYSTOLIC BLOOD PRESSURE: 110 MMHG

## 2019-09-20 DIAGNOSIS — N92.0 EXCESSIVE AND FREQUENT MENSTRUATION WITH REGULAR CYCLE: ICD-10-CM

## 2019-09-20 DIAGNOSIS — I83.93 ASYMPTOMATIC VARICOSE VEINS OF BILATERAL LOWER EXTREMITIES: ICD-10-CM

## 2019-09-20 PROCEDURE — 99024 POSTOP FOLLOW-UP VISIT: CPT

## 2019-09-20 NOTE — PHYSICAL EXAM
[JVD] : no jugular venous distention  [Normal Breath Sounds] : Normal breath sounds [Abdominal Masses] : No abdominal masses [No HSM] : no hepatosplenomegaly [Abdomen Tenderness] : ~T ~M No abdominal tenderness [No Rash or Lesion] : No rash or lesion [Alert] : alert [Oriented to Person] : oriented to person [Oriented to Time] : oriented to time [Oriented to Place] : oriented to place [de-identified] : doing well ,no c/o  [Calm] : calm [de-identified] : MELANIE [de-identified] : soft , non tender , incision healed , no bulge or cellulitis

## 2019-09-23 ENCOUNTER — OUTPATIENT (OUTPATIENT)
Dept: OUTPATIENT SERVICES | Facility: HOSPITAL | Age: 39
LOS: 1 days | Discharge: HOME | End: 2019-09-23

## 2019-09-23 DIAGNOSIS — Z98.891 HISTORY OF UTERINE SCAR FROM PREVIOUS SURGERY: Chronic | ICD-10-CM

## 2019-09-23 DIAGNOSIS — Z98.51 TUBAL LIGATION STATUS: Chronic | ICD-10-CM

## 2019-11-21 ENCOUNTER — APPOINTMENT (OUTPATIENT)
Dept: OBGYN | Facility: CLINIC | Age: 39
End: 2019-11-21
Payer: COMMERCIAL

## 2019-11-26 ENCOUNTER — APPOINTMENT (OUTPATIENT)
Dept: OBGYN | Facility: CLINIC | Age: 39
End: 2019-11-26
Payer: COMMERCIAL

## 2019-11-26 ENCOUNTER — OUTPATIENT (OUTPATIENT)
Dept: OUTPATIENT SERVICES | Facility: HOSPITAL | Age: 39
LOS: 1 days | Discharge: HOME | End: 2019-11-26

## 2019-11-26 VITALS — WEIGHT: 155 LBS | DIASTOLIC BLOOD PRESSURE: 80 MMHG | SYSTOLIC BLOOD PRESSURE: 128 MMHG | BODY MASS INDEX: 31.31 KG/M2

## 2019-11-26 DIAGNOSIS — Z98.891 HISTORY OF UTERINE SCAR FROM PREVIOUS SURGERY: Chronic | ICD-10-CM

## 2019-11-26 DIAGNOSIS — Z98.51 TUBAL LIGATION STATUS: Chronic | ICD-10-CM

## 2019-11-26 PROCEDURE — 99212 OFFICE O/P EST SF 10 MIN: CPT

## 2019-11-26 NOTE — HISTORY OF PRESENT ILLNESS
[Definite:  ___ (Date)] : the last menstrual period was [unfilled] [Regular Cycle Intervals] : periods have been regular [Good] : being in good health [Reproductive Age] : is of reproductive age [Contraception] : uses contraception [Condoms] : uses condoms [Itching] : itching [Burning] : burning [Sexually Active] : is sexually active [Monogamous] : is monogamous [Male ___] : [unfilled] male [Lesion] : no lesion [Discharge] : no discharge

## 2019-11-26 NOTE — PHYSICAL EXAM
[Labia Minora Erythema] : erythema of the labia minora [Normal] : uterus [Scant] : there was scant vaginal bleeding [Labia Majora] : labia major [Discharge] : had no discharge

## 2019-11-27 DIAGNOSIS — N89.8 OTHER SPECIFIED NONINFLAMMATORY DISORDERS OF VAGINA: ICD-10-CM

## 2019-12-09 LAB
A VAGINAE DNA VAG QL NAA+PROBE: NORMAL
BVAB2 DNA VAG QL NAA+PROBE: NORMAL
C KRUSEI DNA VAG QL NAA+PROBE: NEGATIVE
C TRACH RRNA SPEC QL NAA+PROBE: NEGATIVE
MEGA1 DNA VAG QL NAA+PROBE: ABNORMAL
N GONORRHOEA RRNA SPEC QL NAA+PROBE: NEGATIVE
T VAGINALIS RRNA SPEC QL NAA+PROBE: NEGATIVE

## 2019-12-10 ENCOUNTER — MESSAGE (OUTPATIENT)
Age: 39
End: 2019-12-10

## 2019-12-18 ENCOUNTER — MEDICATION RENEWAL (OUTPATIENT)
Age: 39
End: 2019-12-18

## 2019-12-18 RX ORDER — METRONIDAZOLE 500 MG/1
500 TABLET ORAL TWICE DAILY
Qty: 14 | Refills: 0 | Status: DISCONTINUED | COMMUNITY
Start: 2019-12-09 | End: 2019-12-18

## 2020-02-27 ENCOUNTER — APPOINTMENT (OUTPATIENT)
Dept: OBGYN | Facility: CLINIC | Age: 40
End: 2020-02-27
Payer: COMMERCIAL

## 2020-02-27 ENCOUNTER — OUTPATIENT (OUTPATIENT)
Dept: OUTPATIENT SERVICES | Facility: HOSPITAL | Age: 40
LOS: 1 days | Discharge: HOME | End: 2020-02-27

## 2020-02-27 VITALS — BODY MASS INDEX: 30.9 KG/M2 | SYSTOLIC BLOOD PRESSURE: 116 MMHG | DIASTOLIC BLOOD PRESSURE: 74 MMHG | WEIGHT: 153 LBS

## 2020-02-27 DIAGNOSIS — Z98.51 TUBAL LIGATION STATUS: Chronic | ICD-10-CM

## 2020-02-27 DIAGNOSIS — Z98.891 HISTORY OF UTERINE SCAR FROM PREVIOUS SURGERY: Chronic | ICD-10-CM

## 2020-02-27 PROCEDURE — 99395 PREV VISIT EST AGE 18-39: CPT

## 2020-02-28 DIAGNOSIS — Z01.419 ENCOUNTER FOR GYNECOLOGICAL EXAMINATION (GENERAL) (ROUTINE) WITHOUT ABNORMAL FINDINGS: ICD-10-CM

## 2020-09-17 ENCOUNTER — OUTPATIENT (OUTPATIENT)
Dept: OUTPATIENT SERVICES | Facility: HOSPITAL | Age: 40
LOS: 1 days | Discharge: HOME | End: 2020-09-17

## 2020-09-17 DIAGNOSIS — Z01.21 ENCOUNTER FOR DENTAL EXAMINATION AND CLEANING WITH ABNORMAL FINDINGS: ICD-10-CM

## 2020-09-17 DIAGNOSIS — Z98.51 TUBAL LIGATION STATUS: Chronic | ICD-10-CM

## 2020-09-17 DIAGNOSIS — Z98.891 HISTORY OF UTERINE SCAR FROM PREVIOUS SURGERY: Chronic | ICD-10-CM

## 2021-03-08 ENCOUNTER — APPOINTMENT (OUTPATIENT)
Dept: OBGYN | Facility: CLINIC | Age: 41
End: 2021-03-08

## 2021-11-15 ENCOUNTER — APPOINTMENT (OUTPATIENT)
Dept: INTERNAL MEDICINE | Facility: CLINIC | Age: 41
End: 2021-11-15
Payer: COMMERCIAL

## 2021-11-15 ENCOUNTER — OUTPATIENT (OUTPATIENT)
Dept: OUTPATIENT SERVICES | Facility: HOSPITAL | Age: 41
LOS: 1 days | Discharge: HOME | End: 2021-11-15

## 2021-11-15 ENCOUNTER — NON-APPOINTMENT (OUTPATIENT)
Age: 41
End: 2021-11-15

## 2021-11-15 VITALS
WEIGHT: 163 LBS | HEART RATE: 67 BPM | TEMPERATURE: 96.7 F | SYSTOLIC BLOOD PRESSURE: 128 MMHG | OXYGEN SATURATION: 99 % | BODY MASS INDEX: 32.86 KG/M2 | DIASTOLIC BLOOD PRESSURE: 63 MMHG | HEIGHT: 59 IN

## 2021-11-15 DIAGNOSIS — Z98.51 TUBAL LIGATION STATUS: Chronic | ICD-10-CM

## 2021-11-15 DIAGNOSIS — Z98.891 HISTORY OF UTERINE SCAR FROM PREVIOUS SURGERY: Chronic | ICD-10-CM

## 2021-11-15 PROCEDURE — 99213 OFFICE O/P EST LOW 20 MIN: CPT | Mod: GC

## 2021-11-15 RX ORDER — CLOTRIMAZOLE AND BETAMETHASONE DIPROPIONATE 10; .5 MG/G; MG/G
1-0.05 CREAM TOPICAL TWICE DAILY
Qty: 1 | Refills: 0 | Status: COMPLETED | COMMUNITY
Start: 2019-11-26 | End: 2021-11-15

## 2021-11-15 RX ORDER — FLUCONAZOLE 150 MG/1
150 TABLET ORAL
Qty: 5 | Refills: 0 | Status: COMPLETED | COMMUNITY
Start: 2019-11-26 | End: 2021-11-15

## 2021-11-15 NOTE — PHYSICAL EXAM
[No Acute Distress] : no acute distress [Well Nourished] : well nourished [Well Developed] : well developed [Well-Appearing] : well-appearing [Normal Sclera/Conjunctiva] : normal sclera/conjunctiva [PERRL] : pupils equal round and reactive to light [Normal Outer Ear/Nose] : the outer ears and nose were normal in appearance [Normal Oropharynx] : the oropharynx was normal [No JVD] : no jugular venous distention [No Respiratory Distress] : no respiratory distress  [No Accessory Muscle Use] : no accessory muscle use [Clear to Auscultation] : lungs were clear to auscultation bilaterally [Normal Rate] : normal rate  [Regular Rhythm] : with a regular rhythm [Normal S1, S2] : normal S1 and S2 [Soft] : abdomen soft [Non Tender] : non-tender [Non-distended] : non-distended [No HSM] : no HSM [Normal Bowel Sounds] : normal bowel sounds [Normal Posterior Cervical Nodes] : no posterior cervical lymphadenopathy [Normal Anterior Cervical Nodes] : no anterior cervical lymphadenopathy [No CVA Tenderness] : no CVA  tenderness [No Spinal Tenderness] : no spinal tenderness [No Joint Swelling] : no joint swelling [Grossly Normal Strength/Tone] : grossly normal strength/tone [No Rash] : no rash [Coordination Grossly Intact] : coordination grossly intact [No Focal Deficits] : no focal deficits [Normal Gait] : normal gait [Normal Affect] : the affect was normal [Normal Insight/Judgement] : insight and judgment were intact

## 2021-11-17 LAB
ALBUMIN SERPL ELPH-MCNC: 4.7 G/DL
ALP BLD-CCNC: 71 U/L
ALT SERPL-CCNC: 21 U/L
ANION GAP SERPL CALC-SCNC: 14 MMOL/L
APPEARANCE: CLEAR
AST SERPL-CCNC: 17 U/L
BASOPHILS # BLD AUTO: 0.05 K/UL
BASOPHILS NFR BLD AUTO: 0.6 %
BILIRUB SERPL-MCNC: 0.6 MG/DL
BILIRUBIN URINE: NEGATIVE
BLOOD URINE: NEGATIVE
BUN SERPL-MCNC: 9 MG/DL
CALCIUM SERPL-MCNC: 9.5 MG/DL
CHLORIDE SERPL-SCNC: 102 MMOL/L
CHOLEST SERPL-MCNC: 197 MG/DL
CO2 SERPL-SCNC: 24 MMOL/L
COLOR: NORMAL
CREAT SERPL-MCNC: 0.7 MG/DL
EOSINOPHIL # BLD AUTO: 0.25 K/UL
EOSINOPHIL NFR BLD AUTO: 3 %
ESTIMATED AVERAGE GLUCOSE: 111 MG/DL
GLUCOSE QUALITATIVE U: NEGATIVE
GLUCOSE SERPL-MCNC: 89 MG/DL
HBA1C MFR BLD HPLC: 5.5 %
HCT VFR BLD CALC: 42.5 %
HDLC SERPL-MCNC: 61 MG/DL
HGB BLD-MCNC: 13.5 G/DL
IMM GRANULOCYTES NFR BLD AUTO: 0.2 %
KETONES URINE: NEGATIVE
LDLC SERPL CALC-MCNC: 131 MG/DL
LEUKOCYTE ESTERASE URINE: NEGATIVE
LYMPHOCYTES # BLD AUTO: 2.33 K/UL
LYMPHOCYTES NFR BLD AUTO: 28.2 %
MAN DIFF?: NORMAL
MCHC RBC-ENTMCNC: 29 PG
MCHC RBC-ENTMCNC: 31.8 G/DL
MCV RBC AUTO: 91.4 FL
MONOCYTES # BLD AUTO: 0.48 K/UL
MONOCYTES NFR BLD AUTO: 5.8 %
NEUTROPHILS # BLD AUTO: 5.12 K/UL
NEUTROPHILS NFR BLD AUTO: 62.2 %
NITRITE URINE: NEGATIVE
NONHDLC SERPL-MCNC: 136 MG/DL
PH URINE: 6
PLATELET # BLD AUTO: 279 K/UL
POTASSIUM SERPL-SCNC: 4.2 MMOL/L
PROT SERPL-MCNC: 7.8 G/DL
PROTEIN URINE: NEGATIVE
RBC # BLD: 4.65 M/UL
RBC # FLD: 14.1 %
SODIUM SERPL-SCNC: 140 MMOL/L
SPECIFIC GRAVITY URINE: 1.01
T4 FREE SERPL-MCNC: 1.1 NG/DL
TRIGL SERPL-MCNC: 90 MG/DL
TSH SERPL-ACNC: 1.2 UIU/ML
UROBILINOGEN URINE: NORMAL
WBC # FLD AUTO: 8.25 K/UL

## 2021-11-17 NOTE — HISTORY OF PRESENT ILLNESS
[FreeTextEntry1] : Follow up [de-identified] : Pt is a 42 y/o female with PMHx of umbilical hernia s/p repair with mesh who presents today for follow up. Pt has no complaints at this time. She denies Chest pain, SOB, Abdominal pain, nausea or vomiting.

## 2021-11-17 NOTE — REVIEW OF SYSTEMS
[Fever] : no fever [Chills] : no chills [Night Sweats] : no night sweats [Recent Change In Weight] : ~T no recent weight change [Discharge] : no discharge [Pain] : no pain [Redness] : no redness [Vision Problems] : no vision problems [Itching] : no itching [Earache] : no earache [Hearing Loss] : no hearing loss [Nosebleed] : no nosebleeds [Nasal Discharge] : no nasal discharge [Sore Throat] : no sore throat [Chest Pain] : no chest pain [Palpitations] : no palpitations [Lower Ext Edema] : no lower extremity edema [Orthopnea] : no orthopnea [Shortness Of Breath] : no shortness of breath [Wheezing] : no wheezing [Cough] : no cough [Abdominal Pain] : no abdominal pain [Nausea] : no nausea [Constipation] : no constipation [Diarrhea] : diarrhea [Vomiting] : no vomiting [Heartburn] : no heartburn [Dysuria] : no dysuria [Hematuria] : no hematuria [Frequency] : no frequency [Joint Pain] : no joint pain [Muscle Pain] : no muscle pain [Skin Rash] : no skin rash [Headache] : no headache [Dizziness] : no dizziness [Fainting] : no fainting [Unsteady Walking] : no ataxia [Suicidal] : not suicidal [Insomnia] : no insomnia

## 2021-11-17 NOTE — ASSESSMENT
[FreeTextEntry1] : Pt is a 42 y/o female with PMHx of umbilical hernia s/p repair w/ mesh who presents today for follow up.\par \par #HCM\par -Pap smear up to date (02/2020)\par -Pt refused flu vaccine\par -Covid19 vaccinated- Moderna (Last dose 06/2021)\par -Referral for blood work given\par -F/u in 1 month

## 2021-11-29 ENCOUNTER — OUTPATIENT (OUTPATIENT)
Dept: OUTPATIENT SERVICES | Facility: HOSPITAL | Age: 41
LOS: 1 days | Discharge: HOME | End: 2021-11-29

## 2021-11-29 ENCOUNTER — NON-APPOINTMENT (OUTPATIENT)
Age: 41
End: 2021-11-29

## 2021-11-29 ENCOUNTER — APPOINTMENT (OUTPATIENT)
Dept: INTERNAL MEDICINE | Facility: CLINIC | Age: 41
End: 2021-11-29
Payer: COMMERCIAL

## 2021-11-29 VITALS
BODY MASS INDEX: 31.85 KG/M2 | WEIGHT: 158 LBS | HEIGHT: 59 IN | TEMPERATURE: 98.2 F | SYSTOLIC BLOOD PRESSURE: 106 MMHG | DIASTOLIC BLOOD PRESSURE: 68 MMHG | HEART RATE: 67 BPM | OXYGEN SATURATION: 99 %

## 2021-11-29 DIAGNOSIS — Z98.51 TUBAL LIGATION STATUS: Chronic | ICD-10-CM

## 2021-11-29 DIAGNOSIS — Z98.891 HISTORY OF UTERINE SCAR FROM PREVIOUS SURGERY: Chronic | ICD-10-CM

## 2021-11-29 PROCEDURE — 99213 OFFICE O/P EST LOW 20 MIN: CPT | Mod: GC

## 2021-11-29 NOTE — REVIEW OF SYSTEMS
[Fever] : no fever [Chills] : no chills [Night Sweats] : no night sweats [Recent Change In Weight] : ~T no recent weight change [Discharge] : no discharge [Pain] : no pain [Redness] : no redness [Vision Problems] : no vision problems [Itching] : no itching [Earache] : no earache [Hearing Loss] : no hearing loss [Nosebleed] : no nosebleeds [Nasal Discharge] : no nasal discharge [Sore Throat] : no sore throat [Chest Pain] : no chest pain [Palpitations] : no palpitations [Lower Ext Edema] : no lower extremity edema [Orthopnea] : no orthopnea [Shortness Of Breath] : no shortness of breath [Wheezing] : no wheezing [Cough] : no cough [Abdominal Pain] : no abdominal pain [Nausea] : no nausea [Constipation] : no constipation [Diarrhea] : diarrhea [Vomiting] : no vomiting [Heartburn] : no heartburn [Dysuria] : no dysuria [Hematuria] : no hematuria [Frequency] : no frequency [Joint Pain] : no joint pain [Muscle Pain] : no muscle pain [Itching] : no itching [Skin Rash] : no skin rash [Headache] : no headache [Dizziness] : no dizziness [Fainting] : no fainting [Unsteady Walking] : no ataxia [Suicidal] : not suicidal [Insomnia] : no insomnia

## 2021-11-29 NOTE — ASSESSMENT
[FreeTextEntry1] : Pt is a 42 y/o female \par -Pap smear up to date (02/2020)\par -Pt refused flu vaccine\par -Covid19 vaccinated- Moderna (Last dose 06/2021)\par Labs reviewed\par Life style modifications.\par Healthy diet\par Exercise\par F/u in 1 year

## 2021-11-30 DIAGNOSIS — Z00.00 ENCOUNTER FOR GENERAL ADULT MEDICAL EXAMINATION WITHOUT ABNORMAL FINDINGS: ICD-10-CM

## 2022-07-06 ENCOUNTER — OUTPATIENT (OUTPATIENT)
Dept: OUTPATIENT SERVICES | Facility: HOSPITAL | Age: 42
LOS: 1 days | Discharge: HOME | End: 2022-07-06

## 2022-07-06 ENCOUNTER — NON-APPOINTMENT (OUTPATIENT)
Age: 42
End: 2022-07-06

## 2022-07-06 ENCOUNTER — APPOINTMENT (OUTPATIENT)
Dept: INTERNAL MEDICINE | Facility: CLINIC | Age: 42
End: 2022-07-06

## 2022-07-06 VITALS
OXYGEN SATURATION: 98 % | BODY MASS INDEX: 33.26 KG/M2 | SYSTOLIC BLOOD PRESSURE: 135 MMHG | WEIGHT: 165 LBS | DIASTOLIC BLOOD PRESSURE: 80 MMHG | TEMPERATURE: 97.6 F | HEART RATE: 74 BPM | HEIGHT: 59 IN

## 2022-07-06 DIAGNOSIS — N89.8 OTHER SPECIFIED NONINFLAMMATORY DISORDERS OF VAGINA: ICD-10-CM

## 2022-07-06 DIAGNOSIS — Z98.891 HISTORY OF UTERINE SCAR FROM PREVIOUS SURGERY: Chronic | ICD-10-CM

## 2022-07-06 DIAGNOSIS — Z98.51 TUBAL LIGATION STATUS: Chronic | ICD-10-CM

## 2022-07-06 PROCEDURE — 99213 OFFICE O/P EST LOW 20 MIN: CPT | Mod: GC

## 2022-07-06 NOTE — REVIEW OF SYSTEMS
[Fever] : no fever [Chills] : no chills [Night Sweats] : no night sweats [Recent Change In Weight] : ~T no recent weight change [Discharge] : no discharge [Pain] : no pain [Redness] : no redness [Vision Problems] : no vision problems [Itching] : no itching [Earache] : no earache [Hearing Loss] : no hearing loss [Nosebleed] : no nosebleeds [Nasal Discharge] : no nasal discharge [Sore Throat] : no sore throat [Chest Pain] : no chest pain [Palpitations] : no palpitations [Lower Ext Edema] : no lower extremity edema [Orthopnea] : no orthopnea [Shortness Of Breath] : no shortness of breath [Wheezing] : no wheezing [Cough] : no cough [Abdominal Pain] : no abdominal pain [Nausea] : no nausea [Constipation] : no constipation [Diarrhea] : diarrhea [Vomiting] : no vomiting [Heartburn] : no heartburn [Dysuria] : no dysuria [Hematuria] : no hematuria [Frequency] : no frequency [Joint Pain] : no joint pain [Muscle Pain] : no muscle pain [Itching] : no itching [Skin Rash] : no skin rash [Headache] : no headache [Dizziness] : no dizziness [Fainting] : no fainting [Unsteady Walking] : no ataxia [Suicidal] : not suicidal [Insomnia] : no insomnia [FreeTextEntry8] : vaginal itchiness

## 2022-07-06 NOTE — HISTORY OF PRESENT ILLNESS
[FreeTextEntry1] : Follow up [de-identified] : Pt is a 40 y/o female with c/o vaginal itchiness, no discharge.\par She has an appointment with GYn in two days.

## 2022-07-06 NOTE — ASSESSMENT
[FreeTextEntry1] : 41 yo female\par Labs in 6 months\par mammogram\par Life style modifications.\par Healthy diet\par Exercise\par F/u in 6 months

## 2022-07-07 DIAGNOSIS — N89.8 OTHER SPECIFIED NONINFLAMMATORY DISORDERS OF VAGINA: ICD-10-CM

## 2022-07-07 DIAGNOSIS — Z00.00 ENCOUNTER FOR GENERAL ADULT MEDICAL EXAMINATION WITHOUT ABNORMAL FINDINGS: ICD-10-CM

## 2022-07-15 ENCOUNTER — RESULT REVIEW (OUTPATIENT)
Age: 42
End: 2022-07-15

## 2022-07-15 ENCOUNTER — APPOINTMENT (OUTPATIENT)
Dept: OBGYN | Facility: CLINIC | Age: 42
End: 2022-07-15

## 2022-07-15 ENCOUNTER — OUTPATIENT (OUTPATIENT)
Dept: OUTPATIENT SERVICES | Facility: HOSPITAL | Age: 42
LOS: 1 days | Discharge: HOME | End: 2022-07-15

## 2022-07-15 VITALS — DIASTOLIC BLOOD PRESSURE: 60 MMHG | WEIGHT: 165 LBS | BODY MASS INDEX: 33.33 KG/M2 | SYSTOLIC BLOOD PRESSURE: 120 MMHG

## 2022-07-15 DIAGNOSIS — Z01.419 ENCOUNTER FOR GYNECOLOGICAL EXAMINATION (GENERAL) (ROUTINE) W/OUT ABNORMAL FINDINGS: ICD-10-CM

## 2022-07-15 DIAGNOSIS — Z98.51 TUBAL LIGATION STATUS: Chronic | ICD-10-CM

## 2022-07-15 DIAGNOSIS — Z98.891 HISTORY OF UTERINE SCAR FROM PREVIOUS SURGERY: Chronic | ICD-10-CM

## 2022-07-15 PROCEDURE — 99396 PREV VISIT EST AGE 40-64: CPT

## 2022-07-21 LAB
A VAGINAE DNA VAG QL NAA+PROBE: NORMAL
BVAB2 DNA VAG QL NAA+PROBE: NORMAL
C KRUSEI DNA VAG QL NAA+PROBE: NEGATIVE
C TRACH RRNA SPEC QL NAA+PROBE: NEGATIVE
HPV HIGH+LOW RISK DNA PNL CVX: NOT DETECTED
MEGA1 DNA VAG QL NAA+PROBE: NORMAL
N GONORRHOEA RRNA SPEC QL NAA+PROBE: NEGATIVE
T VAGINALIS RRNA SPEC QL NAA+PROBE: NEGATIVE

## 2022-07-27 LAB — CYTOLOGY CVX/VAG DOC THIN PREP: NORMAL

## 2022-08-03 ENCOUNTER — OUTPATIENT (OUTPATIENT)
Dept: OUTPATIENT SERVICES | Facility: HOSPITAL | Age: 42
LOS: 1 days | Discharge: HOME | End: 2022-08-03

## 2022-08-03 ENCOUNTER — RESULT REVIEW (OUTPATIENT)
Age: 42
End: 2022-08-03

## 2022-08-03 DIAGNOSIS — Z98.891 HISTORY OF UTERINE SCAR FROM PREVIOUS SURGERY: Chronic | ICD-10-CM

## 2022-08-03 DIAGNOSIS — Z98.51 TUBAL LIGATION STATUS: Chronic | ICD-10-CM

## 2022-08-03 DIAGNOSIS — N92.0 EXCESSIVE AND FREQUENT MENSTRUATION WITH REGULAR CYCLE: ICD-10-CM

## 2022-08-03 DIAGNOSIS — Z12.31 ENCOUNTER FOR SCREENING MAMMOGRAM FOR MALIGNANT NEOPLASM OF BREAST: ICD-10-CM

## 2022-08-03 PROCEDURE — 76856 US EXAM PELVIC COMPLETE: CPT | Mod: 26

## 2022-08-03 PROCEDURE — 77063 BREAST TOMOSYNTHESIS BI: CPT | Mod: 26

## 2022-08-03 PROCEDURE — 77067 SCR MAMMO BI INCL CAD: CPT | Mod: 26

## 2022-08-10 NOTE — ED ADULT TRIAGE NOTE - SOURCE OF INFORMATION
Pt s/p Covid last week.   Still feels fatigue and a \"fog\"  Plans to take a few more days off.  Admitted to having some contractions a few nights ago.  She drank a lot of water and they went away.    No bleeding, no loss of fluid.  Precautions given.    Pt states she does not feel mentally that she can decrease her dose of SSRI.  She is reassured she does not have to do that right now, will readdress at 36 weeks.  Will not change her dose at all if she does not feel comfortable.    Has growth US in 2 weeks.   Patient

## 2022-08-23 ENCOUNTER — OUTPATIENT (OUTPATIENT)
Dept: OUTPATIENT SERVICES | Facility: HOSPITAL | Age: 42
LOS: 1 days | Discharge: HOME | End: 2022-08-23

## 2022-08-23 ENCOUNTER — APPOINTMENT (OUTPATIENT)
Dept: OBGYN | Facility: CLINIC | Age: 42
End: 2022-08-23

## 2022-08-23 VITALS
SYSTOLIC BLOOD PRESSURE: 104 MMHG | DIASTOLIC BLOOD PRESSURE: 60 MMHG | WEIGHT: 164 LBS | HEIGHT: 59 IN | BODY MASS INDEX: 33.06 KG/M2

## 2022-08-23 DIAGNOSIS — Z98.51 TUBAL LIGATION STATUS: Chronic | ICD-10-CM

## 2022-08-23 DIAGNOSIS — Z98.891 HISTORY OF UTERINE SCAR FROM PREVIOUS SURGERY: Chronic | ICD-10-CM

## 2022-08-23 PROCEDURE — 99213 OFFICE O/P EST LOW 20 MIN: CPT

## 2022-09-17 ENCOUNTER — EMERGENCY (EMERGENCY)
Facility: HOSPITAL | Age: 42
LOS: 0 days | Discharge: HOME | End: 2022-09-17
Attending: STUDENT IN AN ORGANIZED HEALTH CARE EDUCATION/TRAINING PROGRAM | Admitting: STUDENT IN AN ORGANIZED HEALTH CARE EDUCATION/TRAINING PROGRAM

## 2022-09-17 VITALS
OXYGEN SATURATION: 100 % | DIASTOLIC BLOOD PRESSURE: 71 MMHG | SYSTOLIC BLOOD PRESSURE: 124 MMHG | HEIGHT: 55 IN | RESPIRATION RATE: 18 BRPM | HEART RATE: 78 BPM | TEMPERATURE: 97 F

## 2022-09-17 DIAGNOSIS — R51.9 HEADACHE, UNSPECIFIED: ICD-10-CM

## 2022-09-17 DIAGNOSIS — R11.0 NAUSEA: ICD-10-CM

## 2022-09-17 DIAGNOSIS — Z98.891 HISTORY OF UTERINE SCAR FROM PREVIOUS SURGERY: Chronic | ICD-10-CM

## 2022-09-17 DIAGNOSIS — Z86.19 PERSONAL HISTORY OF OTHER INFECTIOUS AND PARASITIC DISEASES: ICD-10-CM

## 2022-09-17 DIAGNOSIS — R42 DIZZINESS AND GIDDINESS: ICD-10-CM

## 2022-09-17 DIAGNOSIS — Z98.51 TUBAL LIGATION STATUS: Chronic | ICD-10-CM

## 2022-09-17 PROCEDURE — 99284 EMERGENCY DEPT VISIT MOD MDM: CPT

## 2022-09-17 PROCEDURE — 70450 CT HEAD/BRAIN W/O DYE: CPT | Mod: 26,MA

## 2022-09-17 NOTE — ED PROVIDER NOTE - CLINICAL SUMMARY MEDICAL DECISION MAKING FREE TEXT BOX
42-year-old female presents here complaining of intermittent episodes of headache dizziness nausea states after diet has been getting better but over the last 2 weeks wakes up with the symptoms and they subside as the days go on no blurry vision neck stiffness fevers chills abdominal pain vs reviewed imaging obtained and reviewed ct demonstrated No acute intracranial pathology. patient with no symtpoms currently. Patient a spoken to in detail about results  All questions addressed.  Results of ED work up discussed and patient given a copy of the results. Patient has proper follow up. Return precautions given. patient to follow up with neurology

## 2022-09-17 NOTE — ED PROVIDER NOTE - NS ED ATTENDING STATEMENT MOD
This was a shared visit with the SORAYA. I reviewed and verified the documentation and independently performed the documented:

## 2022-09-17 NOTE — ED PROVIDER NOTE - OBJECTIVE STATEMENT
pt presents to ED for eval of intermittent episodes of nausea, HA and dizziness over the last week. no sxs currently, but decided to register as she is in ED with her daughter for a diff issue. no sick contacts, no recent travel and episodes resolve spontaneously. Denies fever/chill/chest pain/palpitation/sob/abd pain/n/v/d/ black stool/bloody stool/urinary sxs

## 2022-09-17 NOTE — ED PROVIDER NOTE - ATTENDING APP SHARED VISIT CONTRIBUTION OF CARE
I personally evaluated the patient. I reviewed the Resident’s or Physician Assistant’s note (as assigned above), and agree with the findings and plan except as documented in my note.  42-year-old female presents here complaining of intermittent episodes of headache dizziness nausea states after diet has been getting better but over the last 2 weeks wakes up with the symptoms and they subside as the days go on no blurry vision neck stiffness fevers chills abdominal pain   CONSTITUTIONAL: WA / WN / NAD  HEAD: NCAT  EYES: PERRL; EOMI;   ENT: Normal pharynx; mucous membranes pink/moist, no erythema.  NECK: Supple; no meningeal signs  MSK/EXT: No gross deformities; full range of motion.  SKIN: Warm and dry;   NEURO: AAOx3, Motor 5/5 x 4 extremities,  Cerebellar testing normal no dysmetria no dysarthria no facial droop  PSYCH: Memory Intact, Normal Affect

## 2022-09-17 NOTE — ED PROVIDER NOTE - PHYSICAL EXAMINATION
CONSTITUTIONAL: Well-appearing; well-nourished; in no apparent distress.   EYES: PERRL; EOM intact.   NECK: Supple; non-tender; no cervical lymphadenopathy.   CARDIOVASCULAR: Normal S1, S2; no murmurs, rubs, or gallops.   RESPIRATORY: Normal chest excursion with respiration; breath sounds clear and equal bilaterally; no wheezes, rhonchi, or rales.  GI/: non-distended; non-tender; no palpable organomegaly.   MS: No evidence of trauma or deformity. Normal ROM in all four extremities; non-tender to palpation; distal pulses are normal.   SKIN: Normal for age and race; warm; dry; good turgor; no apparent lesions or exudate.   NEURO/PSYCH: no drifting. strength equal to b/l upper and lower extremities. speaking coherently. nml cerebellum test. ambulate with nml and steady gait. no nuchal rigidity. negative Kernig’s and Brudzinski’s signs. A & O x 4; grossly unremarkable. mood and manner are appropriate.

## 2022-09-17 NOTE — ED PROVIDER NOTE - NSFOLLOWUPCLINICS_GEN_ALL_ED_FT
[facility]  Neurology  [address] [place] [address], [address]  [place] [address]  Phone: [phone]  Fax:     [facility]  Medicine  [address] [place], [place]   Phone: [phone]  Fax:      impairments found [facility]  Neurology  [address] [place] [address], [address]  [place] [address]  Phone: [phone]  Fax:     [facility]  Medicine  [address] [place], [place]   Phone: [phone]  Fax:

## 2022-09-17 NOTE — ED PROVIDER NOTE - PATIENT PORTAL LINK FT
You can access the FollowMyHealth Patient Portal offered by [facility] by registering at the following website: [email or web address]. By joining [employer]’s FollowMyHealth portal, you will also be able to view your health information using other applications (apps) compatible with our system. You can access the FollowMyHealth Patient Portal offered by [facility] by registering at the following website: [email or web address]. By joining [employer]’s FollowMyHealth portal, you will also be able to view your health information using other applications (apps) compatible with our system.

## 2022-09-17 NOTE — ED PROVIDER NOTE - NSFOLLOWUPINSTRUCTIONS_ED_ALL_ED_FT
Dizziness    WHAT YOU NEED TO KNOW:    Dizziness is a feeling of being off balance or unsteady. Common causes of dizziness are an inner ear fluid imbalance or a lack of oxygen in your blood. Dizziness may be acute (lasts 3 days or less) or chronic (lasts longer than 3 days). You may have dizzy spells that last from seconds to a few hours.     DISCHARGE INSTRUCTIONS:    Return to the emergency department if:     You have a headache and a stiff neck.      You have shaking chills and a fever.       You vomit over and over with no relief.       Your vomit or bowel movements are red or black.       You have pain in your chest, back, or abdomen.       You have numbness, especially in your face, arms, or legs.       You have trouble moving your arms or legs.       You are confused.     Contact your healthcare provider if:     You have a fever.       Your symptoms do not get better with treatment.       You have questions or concerns about your condition or care.     Manage your symptoms:     Do not drive or operate heavy machinery when you are dizzy.       Get up slowly from sitting or lying down.       Drink plenty of liquids. Liquids help prevent dehydration. Ask how much liquid to drink each day and which liquids are best for you.    Follow up with your healthcare provider as directed: Write down your questions so you remember to ask them during your visits.        © Copyright [employer] 2019 All illustrations and images included in CareNotes are the copyrighted property of [employer] or [employer].

## 2022-10-26 ENCOUNTER — OUTPATIENT (OUTPATIENT)
Dept: OUTPATIENT SERVICES | Facility: HOSPITAL | Age: 42
LOS: 1 days | Discharge: HOME | End: 2022-10-26

## 2022-10-26 ENCOUNTER — LABORATORY RESULT (OUTPATIENT)
Age: 42
End: 2022-10-26

## 2022-10-26 DIAGNOSIS — Z98.51 TUBAL LIGATION STATUS: Chronic | ICD-10-CM

## 2022-10-26 DIAGNOSIS — Z98.891 HISTORY OF UTERINE SCAR FROM PREVIOUS SURGERY: Chronic | ICD-10-CM

## 2022-10-26 LAB
ALBUMIN SERPL ELPH-MCNC: 4.6 G/DL
ALP BLD-CCNC: 75 U/L
ALT SERPL-CCNC: 24 U/L
ANION GAP SERPL CALC-SCNC: 12 MMOL/L
AST SERPL-CCNC: 19 U/L
BASOPHILS # BLD AUTO: 0.04 K/UL
BASOPHILS NFR BLD AUTO: 0.6 %
BILIRUB SERPL-MCNC: 0.4 MG/DL
BUN SERPL-MCNC: 12 MG/DL
CALCIUM SERPL-MCNC: 9.6 MG/DL
CHLORIDE SERPL-SCNC: 102 MMOL/L
CHOLEST SERPL-MCNC: 196 MG/DL
CO2 SERPL-SCNC: 25 MMOL/L
CREAT SERPL-MCNC: 0.7 MG/DL
EGFR: 112 ML/MIN/1.73M2
EOSINOPHIL # BLD AUTO: 0.25 K/UL
EOSINOPHIL NFR BLD AUTO: 3.9 %
GLUCOSE SERPL-MCNC: 96 MG/DL
HCT VFR BLD CALC: 39.6 %
HDLC SERPL-MCNC: 50 MG/DL
HGB BLD-MCNC: 12.6 G/DL
IMM GRANULOCYTES NFR BLD AUTO: 0.2 %
LDLC SERPL CALC-MCNC: 121 MG/DL
LYMPHOCYTES # BLD AUTO: 1.93 K/UL
LYMPHOCYTES NFR BLD AUTO: 29.8 %
MAN DIFF?: NORMAL
MCHC RBC-ENTMCNC: 27.9 PG
MCHC RBC-ENTMCNC: 31.8 G/DL
MCV RBC AUTO: 87.6 FL
MONOCYTES # BLD AUTO: 0.38 K/UL
MONOCYTES NFR BLD AUTO: 5.9 %
NEUTROPHILS # BLD AUTO: 3.86 K/UL
NEUTROPHILS NFR BLD AUTO: 59.6 %
NONHDLC SERPL-MCNC: 146 MG/DL
PLATELET # BLD AUTO: 264 K/UL
POTASSIUM SERPL-SCNC: 4.4 MMOL/L
PROT SERPL-MCNC: 7.4 G/DL
RBC # BLD: 4.52 M/UL
RBC # FLD: 14.5 %
SODIUM SERPL-SCNC: 139 MMOL/L
T4 FREE SERPL-MCNC: 1 NG/DL
TRIGL SERPL-MCNC: 127 MG/DL
TSH SERPL-ACNC: 1.13 UIU/ML
WBC # FLD AUTO: 6.47 K/UL

## 2022-10-31 LAB
APPEARANCE: CLEAR
BILIRUBIN URINE: NEGATIVE
BLOOD URINE: ABNORMAL
COLOR: NORMAL
ESTIMATED AVERAGE GLUCOSE: 114 MG/DL
GLUCOSE QUALITATIVE U: NEGATIVE
HBA1C MFR BLD HPLC: 5.6 %
KETONES URINE: NEGATIVE
LEUKOCYTE ESTERASE URINE: NEGATIVE
NITRITE URINE: POSITIVE
PH URINE: 7
PROTEIN URINE: NEGATIVE
SPECIFIC GRAVITY URINE: 1.01
UROBILINOGEN URINE: NORMAL

## 2022-11-03 ENCOUNTER — OUTPATIENT (OUTPATIENT)
Dept: OUTPATIENT SERVICES | Facility: HOSPITAL | Age: 42
LOS: 1 days | Discharge: HOME | End: 2022-11-03

## 2022-11-03 ENCOUNTER — APPOINTMENT (OUTPATIENT)
Dept: INTERNAL MEDICINE | Facility: CLINIC | Age: 42
End: 2022-11-03

## 2022-11-03 VITALS
DIASTOLIC BLOOD PRESSURE: 77 MMHG | OXYGEN SATURATION: 98 % | SYSTOLIC BLOOD PRESSURE: 119 MMHG | WEIGHT: 167 LBS | HEIGHT: 59 IN | HEART RATE: 78 BPM | TEMPERATURE: 97.2 F | BODY MASS INDEX: 33.67 KG/M2

## 2022-11-03 VITALS
BODY MASS INDEX: 33.67 KG/M2 | DIASTOLIC BLOOD PRESSURE: 77 MMHG | HEIGHT: 59 IN | OXYGEN SATURATION: 98 % | HEART RATE: 87 BPM | TEMPERATURE: 97 F | SYSTOLIC BLOOD PRESSURE: 119 MMHG | WEIGHT: 167 LBS

## 2022-11-03 DIAGNOSIS — Z98.51 TUBAL LIGATION STATUS: Chronic | ICD-10-CM

## 2022-11-03 DIAGNOSIS — Z23 ENCOUNTER FOR IMMUNIZATION: ICD-10-CM

## 2022-11-03 DIAGNOSIS — Z98.891 HISTORY OF UTERINE SCAR FROM PREVIOUS SURGERY: Chronic | ICD-10-CM

## 2022-11-03 PROCEDURE — 99214 OFFICE O/P EST MOD 30 MIN: CPT | Mod: GC

## 2022-11-03 RX ORDER — FLUCONAZOLE 150 MG/1
150 TABLET ORAL
Qty: 1 | Refills: 1 | Status: COMPLETED | COMMUNITY
Start: 2022-07-15 | End: 2022-11-03

## 2022-11-03 NOTE — HISTORY OF PRESENT ILLNESS
[FreeTextEntry1] : f/up  [de-identified] : 42 year old female presents with 3 complaints:\par 1. right knee pain. knee pain hurts at rest but is worsened with activity. duration about 3 months.\par 2. Patient reports right thumb pain with numbness from the right wrist distally covering whole hand (radial and ulnar sides) with no symptoms more proximal of the right wrist. duration 3 months. \par 3. sore throat for about a month \par \par no fevers, night sweats, abdominal pain.

## 2022-11-03 NOTE — ASSESSMENT
[FreeTextEntry1] : #knee pain\par #wrist pain\par -no history of trauma\par -works cutting food and cleaning\par -ordering meloxicam for 2 weeks\par -if no improvement should return to clinic\par \par #throat pain\par -likely due to allergies\par -advised to use Claritin and if no improvement return to clinic\par \par HCM\par -per patient recent pap and concetta done\par - advised patient on diet and exercise

## 2022-11-03 NOTE — PHYSICAL EXAM
[No Acute Distress] : no acute distress [Well Nourished] : well nourished [Well Developed] : well developed [Well-Appearing] : well-appearing [Normal Sclera/Conjunctiva] : normal sclera/conjunctiva [EOMI] : extraocular movements intact [Normal Outer Ear/Nose] : the outer ears and nose were normal in appearance [Normal Oropharynx] : the oropharynx was normal [Supple] : supple [Thyroid Normal, No Nodules] : the thyroid was normal and there were no nodules present [No Respiratory Distress] : no respiratory distress  [No Accessory Muscle Use] : no accessory muscle use [Clear to Auscultation] : lungs were clear to auscultation bilaterally [Regular Rhythm] : with a regular rhythm [Normal S1, S2] : normal S1 and S2 [No Murmur] : no murmur heard [No Edema] : there was no peripheral edema [Soft] : abdomen soft [Non Tender] : non-tender [Non-distended] : non-distended [No Focal Deficits] : no focal deficits [Normal Affect] : the affect was normal [de-identified] : pain in medial and lateral right knee with pressure, pain with median and lateral right wrist with pressure [de-identified] : no rash on exposed skin

## 2022-11-03 NOTE — REVIEW OF SYSTEMS
[Hoarseness] : hoarseness [Cough] : cough [Joint Pain] : joint pain [Fever] : no fever [Night Sweats] : no night sweats [Redness] : no redness [Dryness] : no dryness  [Chest Pain] : no chest pain [Shortness Of Breath] : no shortness of breath [Wheezing] : no wheezing [Dyspnea on Exertion] : no dyspnea on exertion [Abdominal Pain] : no abdominal pain [Vomiting] : no vomiting [Dysuria] : no dysuria [Skin Rash] : no skin rash [Headache] : no headache [Suicidal] : not suicidal [Insomnia] : no insomnia [Anxiety] : no anxiety [Depression] : no depression [FreeTextEntry9] : see hpi

## 2023-01-20 ENCOUNTER — OUTPATIENT (OUTPATIENT)
Dept: OUTPATIENT SERVICES | Facility: HOSPITAL | Age: 43
LOS: 1 days | Discharge: HOME | End: 2023-01-20

## 2023-01-20 ENCOUNTER — APPOINTMENT (OUTPATIENT)
Dept: INTERNAL MEDICINE | Facility: CLINIC | Age: 43
End: 2023-01-20
Payer: SUBSIDIZED

## 2023-01-20 VITALS
HEIGHT: 59 IN | DIASTOLIC BLOOD PRESSURE: 71 MMHG | WEIGHT: 165 LBS | BODY MASS INDEX: 33.26 KG/M2 | OXYGEN SATURATION: 96 % | HEART RATE: 70 BPM | SYSTOLIC BLOOD PRESSURE: 117 MMHG | TEMPERATURE: 97.7 F

## 2023-01-20 DIAGNOSIS — Z98.51 TUBAL LIGATION STATUS: Chronic | ICD-10-CM

## 2023-01-20 DIAGNOSIS — Z87.19 PERSONAL HISTORY OF OTHER DISEASES OF THE DIGESTIVE SYSTEM: ICD-10-CM

## 2023-01-20 DIAGNOSIS — M25.569 PAIN IN UNSPECIFIED KNEE: ICD-10-CM

## 2023-01-20 DIAGNOSIS — Z98.891 HISTORY OF UTERINE SCAR FROM PREVIOUS SURGERY: Chronic | ICD-10-CM

## 2023-01-20 DIAGNOSIS — Z00.00 ENCOUNTER FOR GENERAL ADULT MEDICAL EXAMINATION W/OUT ABNORMAL FINDINGS: ICD-10-CM

## 2023-01-20 DIAGNOSIS — R07.0 PAIN IN THROAT: ICD-10-CM

## 2023-01-20 DIAGNOSIS — B96.89 ACUTE VAGINITIS: ICD-10-CM

## 2023-01-20 DIAGNOSIS — N76.0 ACUTE VAGINITIS: ICD-10-CM

## 2023-01-20 PROCEDURE — 99214 OFFICE O/P EST MOD 30 MIN: CPT | Mod: GC

## 2023-01-20 RX ORDER — PANTOPRAZOLE 40 MG/1
40 TABLET, DELAYED RELEASE ORAL
Qty: 30 | Refills: 0 | Status: ACTIVE | COMMUNITY
Start: 2023-01-20

## 2023-01-20 NOTE — REVIEW OF SYSTEMS
[Sore Throat] : sore throat [Postnasal Drip] : postnasal drip [Joint Pain] : joint pain [Joint Stiffness] : joint stiffness [Fever] : no fever [Chills] : no chills [Fatigue] : no fatigue [Night Sweats] : no night sweats [Discharge] : no discharge [Earache] : no earache [Chest Pain] : no chest pain [Palpitations] : no palpitations [Leg Claudication] : no leg claudication [Lower Ext Edema] : no lower extremity edema [Orthopnea] : no orthopnea [Paroxysmal Nocturnal Dyspnea] : no paroxysmal nocturnal dyspnea [Shortness Of Breath] : no shortness of breath [Wheezing] : no wheezing [Cough] : no cough [Dyspnea on Exertion] : no dyspnea on exertion [Abdominal Pain] : no abdominal pain [Nausea] : no nausea [Constipation] : no constipation [Diarrhea] : diarrhea [Vomiting] : no vomiting [Heartburn] : no heartburn [Melena] : no melena [Dysuria] : no dysuria [Joint Swelling] : no joint swelling

## 2023-01-20 NOTE — HISTORY OF PRESENT ILLNESS
[FreeTextEntry1] : Follow up visit  [de-identified] : This is a 41 y/o F with no significant PMHx is here for a follow up visit. Patient was last seen in the clinic on 11/2022 for knee pain, wrist pain, and sore throat. \par Patient states that she is still endorsing the sore throat intermittently with copious sputum production, symptoms are worse in the morning, denies any cough, runny nose, fever, chills or GERD symptoms. \par Patient is still endorsing knee pain and wrist pain. Endorsing joint stiffness in the AM and pain gets worse with activity.

## 2023-01-20 NOTE — PHYSICAL EXAM
[No Acute Distress] : no acute distress [Well Nourished] : well nourished [Normal Sclera/Conjunctiva] : normal sclera/conjunctiva [Normal Outer Ear/Nose] : the outer ears and nose were normal in appearance [No JVD] : no jugular venous distention [No Lymphadenopathy] : no lymphadenopathy [No Respiratory Distress] : no respiratory distress  [No Accessory Muscle Use] : no accessory muscle use [Clear to Auscultation] : lungs were clear to auscultation bilaterally [Normal Rate] : normal rate  [Regular Rhythm] : with a regular rhythm [Normal S1, S2] : normal S1 and S2 [No Murmur] : no murmur heard [No Carotid Bruits] : no carotid bruits [No Edema] : there was no peripheral edema [Soft] : abdomen soft [Non Tender] : non-tender [No HSM] : no HSM [Normal Posterior Cervical Nodes] : no posterior cervical lymphadenopathy [Normal Anterior Cervical Nodes] : no anterior cervical lymphadenopathy [Normal Gait] : normal gait [Normal Affect] : the affect was normal [Normal Insight/Judgement] : insight and judgment were intact [de-identified] : No erythema, No pus, Post nasal drip seen.  [de-identified] : +Nodular deformity on the DIP of the 4th and 5th phalanx

## 2023-01-20 NOTE — ASSESSMENT
[FreeTextEntry1] : This is a 41 y/o F with no significant PMHx is here for a follow up visit. Patient was last seen in the clinic on 11/2022 for knee pain, wrist pain, and sore throat. \par \par # B/l knee pain\par # R wrist pain\par - R/o OA/RA \par - Has nodes and deformity of the DIP joints on exam \par - no history of trauma\par - works cutting food and cleaning\par - Took meloxicam for 2 weeks without any improvement, take as needed \par - Xray of the R wrist and B/l Knees \par - F/u ESR, DIXIE, CCP \par \par #throat pain 2/2 post nasal drip vs GERD \par - likely due to allergies\par - advised to use Claritin but did not use \par - Fluticasone nasal spray \par - PPI QD \par \par HCM\par - Flu vaccine : refused \par - pap-smear and concetta UTD\par -  advised patient on diet and exercise. \par - RTC in 6wks if no resolution of symptoms, otherwise RTC in 6 months

## 2023-01-25 DIAGNOSIS — M25.539 PAIN IN UNSPECIFIED WRIST: ICD-10-CM

## 2023-01-25 DIAGNOSIS — R07.0 PAIN IN THROAT: ICD-10-CM

## 2023-01-25 DIAGNOSIS — M25.569 PAIN IN UNSPECIFIED KNEE: ICD-10-CM

## 2023-01-27 LAB
ANA SER IF-ACNC: NEGATIVE
CCP AB SER IA-ACNC: <8 UNITS
ERYTHROCYTE [SEDIMENTATION RATE] IN BLOOD BY WESTERGREN METHOD: 9 MM/HR
RF+CCP IGG SER-IMP: NEGATIVE

## 2023-02-21 ENCOUNTER — OUTPATIENT (OUTPATIENT)
Dept: OUTPATIENT SERVICES | Facility: HOSPITAL | Age: 43
LOS: 1 days | End: 2023-02-21
Payer: COMMERCIAL

## 2023-02-21 ENCOUNTER — RESULT REVIEW (OUTPATIENT)
Age: 43
End: 2023-02-21

## 2023-02-21 DIAGNOSIS — M25.569 PAIN IN UNSPECIFIED KNEE: ICD-10-CM

## 2023-02-21 DIAGNOSIS — Z98.51 TUBAL LIGATION STATUS: Chronic | ICD-10-CM

## 2023-02-21 DIAGNOSIS — Z98.891 HISTORY OF UTERINE SCAR FROM PREVIOUS SURGERY: Chronic | ICD-10-CM

## 2023-02-21 DIAGNOSIS — M79.643 PAIN IN UNSPECIFIED HAND: ICD-10-CM

## 2023-02-21 PROCEDURE — 73562 X-RAY EXAM OF KNEE 3: CPT | Mod: 50

## 2023-02-21 PROCEDURE — 73130 X-RAY EXAM OF HAND: CPT | Mod: 26,RT

## 2023-02-21 PROCEDURE — 73562 X-RAY EXAM OF KNEE 3: CPT | Mod: 26,50

## 2023-02-21 PROCEDURE — 73130 X-RAY EXAM OF HAND: CPT | Mod: RT

## 2023-02-22 DIAGNOSIS — M25.569 PAIN IN UNSPECIFIED KNEE: ICD-10-CM

## 2023-02-22 DIAGNOSIS — M79.643 PAIN IN UNSPECIFIED HAND: ICD-10-CM

## 2023-04-27 ENCOUNTER — APPOINTMENT (OUTPATIENT)
Dept: INTERNAL MEDICINE | Facility: CLINIC | Age: 43
End: 2023-04-27

## 2023-06-09 ENCOUNTER — APPOINTMENT (OUTPATIENT)
Dept: INTERNAL MEDICINE | Facility: CLINIC | Age: 43
End: 2023-06-09
Payer: COMMERCIAL

## 2023-06-09 ENCOUNTER — OUTPATIENT (OUTPATIENT)
Dept: OUTPATIENT SERVICES | Facility: HOSPITAL | Age: 43
LOS: 1 days | End: 2023-06-09
Payer: COMMERCIAL

## 2023-06-09 VITALS
WEIGHT: 169 LBS | HEIGHT: 59 IN | DIASTOLIC BLOOD PRESSURE: 63 MMHG | SYSTOLIC BLOOD PRESSURE: 105 MMHG | OXYGEN SATURATION: 100 % | TEMPERATURE: 98.3 F | HEART RATE: 70 BPM | BODY MASS INDEX: 34.07 KG/M2

## 2023-06-09 DIAGNOSIS — Z98.891 HISTORY OF UTERINE SCAR FROM PREVIOUS SURGERY: Chronic | ICD-10-CM

## 2023-06-09 DIAGNOSIS — E78.5 HYPERLIPIDEMIA, UNSPECIFIED: ICD-10-CM

## 2023-06-09 DIAGNOSIS — Z00.00 ENCOUNTER FOR GENERAL ADULT MEDICAL EXAMINATION WITHOUT ABNORMAL FINDINGS: ICD-10-CM

## 2023-06-09 DIAGNOSIS — E66.9 OBESITY, UNSPECIFIED: ICD-10-CM

## 2023-06-09 DIAGNOSIS — Z12.39 ENCOUNTER FOR OTHER SCREENING FOR MALIGNANT NEOPLASM OF BREAST: ICD-10-CM

## 2023-06-09 DIAGNOSIS — Z98.51 TUBAL LIGATION STATUS: Chronic | ICD-10-CM

## 2023-06-09 DIAGNOSIS — M25.539 PAIN IN UNSPECIFIED WRIST: ICD-10-CM

## 2023-06-09 PROCEDURE — 99214 OFFICE O/P EST MOD 30 MIN: CPT | Mod: GC

## 2023-06-09 PROCEDURE — 99214 OFFICE O/P EST MOD 30 MIN: CPT

## 2023-06-09 RX ORDER — FLUTICASONE PROPIONATE 50 UG/1
50 SPRAY, METERED NASAL
Qty: 1 | Refills: 0 | Status: DISCONTINUED | COMMUNITY
Start: 2023-01-20 | End: 2023-06-09

## 2023-06-09 RX ORDER — MELOXICAM 15 MG/1
15 TABLET ORAL
Qty: 15 | Refills: 0 | Status: DISCONTINUED | COMMUNITY
Start: 2022-11-03 | End: 2023-06-09

## 2023-06-09 NOTE — PHYSICAL EXAM
[No Acute Distress] : no acute distress [Well Nourished] : well nourished [Normal Sclera/Conjunctiva] : normal sclera/conjunctiva [PERRL] : pupils equal round and reactive to light [Normal Outer Ear/Nose] : the outer ears and nose were normal in appearance [No Lymphadenopathy] : no lymphadenopathy [No Respiratory Distress] : no respiratory distress  [Normal Rate] : normal rate  [Normal S1, S2] : normal S1 and S2 [No Edema] : there was no peripheral edema [Soft] : abdomen soft [Non Tender] : non-tender [No HSM] : no HSM [No Spinal Tenderness] : no spinal tenderness [No Joint Swelling] : no joint swelling [No Rash] : no rash [No Focal Deficits] : no focal deficits [Normal Gait] : normal gait [Normal Affect] : the affect was normal [Normal Insight/Judgement] : insight and judgment were intact [de-identified] : Tenderness in thenar area on right wrist.

## 2023-06-09 NOTE — ASSESSMENT
[FreeTextEntry1] : This is a 41 y/o F with no significant PMHx is here for a follow up visit for the samw wrist pain\par \par # B/l knee pain\par # R wrist pain\par - Xray of the R wrist and B/l Knees showed osteoarthritis\par - F/u ESR, DIXIE, CCP all negative \par - referred to ortho for wrist pain\par \par # obesity\par # Hyperlipidemia\par - recent lipid profile hdl 50, ldl 121, tg 127 in Oct 2022\par - f/u repeat lipid panel, a1c, cmp\par - advised diet and lifestyle modification\par - regular exercise\par \par HCM\par - Flu vaccine : refused \par - pap-smear and concetta UTD and referral for next year mammogram\par - f/u in 6 months

## 2023-06-09 NOTE — HISTORY OF PRESENT ILLNESS
[FreeTextEntry1] : FOLLOW UP [de-identified] : This is a 43 y/o F with no significant PMH is here for a follow up visit. She is complaining of the right wrist pain, increase in intensity after using the hand, no muscle weakness, has associated numbness and tingling. She is taking OTC pain medication for pain. She had work up for RA which was negative and x ray showed osteoarthritis.\par

## 2023-06-09 NOTE — REVIEW OF SYSTEMS
[Joint Pain] : joint pain [Joint Stiffness] : joint stiffness [Fever] : no fever [Chills] : no chills [Discharge] : no discharge [Pain] : no pain [Earache] : no earache [Hearing Loss] : no hearing loss [Chest Pain] : no chest pain [Palpitations] : no palpitations [Shortness Of Breath] : no shortness of breath [Wheezing] : no wheezing [Abdominal Pain] : no abdominal pain [Nausea] : no nausea [Vomiting] : no vomiting [Dysuria] : no dysuria [Frequency] : no frequency [Joint Swelling] : no joint swelling [Itching] : no itching [Skin Rash] : no skin rash [Headache] : no headache [Dizziness] : no dizziness [Insomnia] : no insomnia [FreeTextEntry9] : RIGHT WRIST

## 2023-06-14 DIAGNOSIS — E78.5 HYPERLIPIDEMIA, UNSPECIFIED: ICD-10-CM

## 2023-06-14 DIAGNOSIS — M25.539 PAIN IN UNSPECIFIED WRIST: ICD-10-CM

## 2023-06-14 DIAGNOSIS — E66.9 OBESITY, UNSPECIFIED: ICD-10-CM

## 2023-06-20 ENCOUNTER — OUTPATIENT (OUTPATIENT)
Dept: OUTPATIENT SERVICES | Facility: HOSPITAL | Age: 43
LOS: 1 days | End: 2023-06-20
Payer: COMMERCIAL

## 2023-06-20 DIAGNOSIS — Z98.891 HISTORY OF UTERINE SCAR FROM PREVIOUS SURGERY: Chronic | ICD-10-CM

## 2023-06-20 DIAGNOSIS — Z98.51 TUBAL LIGATION STATUS: Chronic | ICD-10-CM

## 2023-06-20 DIAGNOSIS — E66.9 OBESITY, UNSPECIFIED: ICD-10-CM

## 2023-06-20 PROCEDURE — 80061 LIPID PANEL: CPT

## 2023-06-20 PROCEDURE — 85027 COMPLETE CBC AUTOMATED: CPT

## 2023-06-20 PROCEDURE — 80053 COMPREHEN METABOLIC PANEL: CPT

## 2023-06-20 PROCEDURE — 83036 HEMOGLOBIN GLYCOSYLATED A1C: CPT

## 2023-06-22 LAB
ALBUMIN SERPL ELPH-MCNC: 4.5 G/DL
ALP BLD-CCNC: 72 U/L
ALT SERPL-CCNC: 23 U/L
ANION GAP SERPL CALC-SCNC: 11 MMOL/L
AST SERPL-CCNC: 18 U/L
BILIRUB SERPL-MCNC: 0.4 MG/DL
BUN SERPL-MCNC: 15 MG/DL
CALCIUM SERPL-MCNC: 9.3 MG/DL
CHLORIDE SERPL-SCNC: 103 MMOL/L
CHOLEST SERPL-MCNC: 203 MG/DL
CO2 SERPL-SCNC: 22 MMOL/L
CREAT SERPL-MCNC: 0.7 MG/DL
EGFR: 111 ML/MIN/1.73M2
ESTIMATED AVERAGE GLUCOSE: 108 MG/DL
GLUCOSE SERPL-MCNC: 86 MG/DL
HBA1C MFR BLD HPLC: 5.4 %
HDLC SERPL-MCNC: 59 MG/DL
LDLC SERPL CALC-MCNC: 122 MG/DL
NONHDLC SERPL-MCNC: 144 MG/DL
POTASSIUM SERPL-SCNC: 4.4 MMOL/L
PROT SERPL-MCNC: 7.3 G/DL
SODIUM SERPL-SCNC: 136 MMOL/L
TRIGL SERPL-MCNC: 110 MG/DL

## 2023-07-10 DIAGNOSIS — E66.9 OBESITY, UNSPECIFIED: ICD-10-CM

## 2023-08-02 ENCOUNTER — APPOINTMENT (OUTPATIENT)
Dept: PLASTIC SURGERY | Facility: CLINIC | Age: 43
End: 2023-08-02
Payer: COMMERCIAL

## 2023-08-02 VITALS — HEIGHT: 59 IN | BODY MASS INDEX: 34.07 KG/M2 | WEIGHT: 169 LBS

## 2023-08-02 DIAGNOSIS — G56.03 CARPAL TUNNEL SYNDROM,BILATERAL UPPER LIMBS: ICD-10-CM

## 2023-08-02 PROCEDURE — 99203 OFFICE O/P NEW LOW 30 MIN: CPT

## 2023-08-02 NOTE — PHYSICAL EXAM
[NI] : Normal [de-identified] : right hand thenar atrophy   diminsihed sensation radial three digits right side   +thenar atrophy B/L R>L

## 2023-08-02 NOTE — ASSESSMENT
[FreeTextEntry1] : EMG night time splint  f/u 6 wks  explained in detail to pt and family  all ?s asnwered

## 2023-08-02 NOTE — HISTORY OF PRESENT ILLNESS
[FreeTextEntry1] : 43 yr old RHD woman seen w children  c/o rigth hand pain, numbness, difficulty w fine motor movements  nonsmoker nondiabetic  works cleaning houses

## 2023-09-14 ENCOUNTER — APPOINTMENT (OUTPATIENT)
Dept: PLASTIC SURGERY | Facility: CLINIC | Age: 43
End: 2023-09-14

## 2023-10-06 ENCOUNTER — OUTPATIENT (OUTPATIENT)
Dept: OUTPATIENT SERVICES | Facility: HOSPITAL | Age: 43
LOS: 1 days | End: 2023-10-06
Payer: COMMERCIAL

## 2023-10-06 DIAGNOSIS — Z98.51 TUBAL LIGATION STATUS: Chronic | ICD-10-CM

## 2023-10-06 DIAGNOSIS — K02.52 DENTAL CARIES ON PIT AND FISSURE SURFACE PENETRATING INTO DENTIN: ICD-10-CM

## 2023-10-06 DIAGNOSIS — Z98.891 HISTORY OF UTERINE SCAR FROM PREVIOUS SURGERY: Chronic | ICD-10-CM

## 2023-10-06 PROCEDURE — D2140: CPT

## 2023-10-20 DIAGNOSIS — K02.9 DENTAL CARIES, UNSPECIFIED: ICD-10-CM

## 2023-10-25 NOTE — ED PROVIDER NOTE - NSFOLLOWUPCLINICSTOKEN_GEN_ALL_ED_FT
Called pt.  verified. Inform pt that Dr. Garland Crump states    her insurance wants her to try Flexeril 10 mg QHS first.   Prescription sent to pharmacy. Pt verbalizes understanding. [id number]: || ||00\01||False;[id number]: || ||00\01||False;

## 2024-05-22 ENCOUNTER — OUTPATIENT (OUTPATIENT)
Dept: OUTPATIENT SERVICES | Facility: HOSPITAL | Age: 44
LOS: 1 days | End: 2024-05-22
Payer: COMMERCIAL

## 2024-05-22 DIAGNOSIS — Z01.20 ENCOUNTER FOR DENTAL EXAMINATION AND CLEANING WITHOUT ABNORMAL FINDINGS: ICD-10-CM

## 2024-05-22 DIAGNOSIS — Z98.51 TUBAL LIGATION STATUS: Chronic | ICD-10-CM

## 2024-05-22 DIAGNOSIS — Z98.891 HISTORY OF UTERINE SCAR FROM PREVIOUS SURGERY: Chronic | ICD-10-CM

## 2024-05-22 PROCEDURE — D1110: CPT

## 2024-05-22 PROCEDURE — D0330: CPT

## 2024-05-22 PROCEDURE — D0274: CPT

## 2024-05-22 PROCEDURE — D0120: CPT

## 2024-05-22 PROCEDURE — D0230: CPT

## 2024-05-30 DIAGNOSIS — Z01.20 ENCOUNTER FOR DENTAL EXAMINATION AND CLEANING WITHOUT ABNORMAL FINDINGS: ICD-10-CM

## 2024-05-30 DIAGNOSIS — Z01.21 ENCOUNTER FOR DENTAL EXAMINATION AND CLEANING WITH ABNORMAL FINDINGS: ICD-10-CM

## 2024-12-17 ENCOUNTER — EMERGENCY (EMERGENCY)
Facility: HOSPITAL | Age: 44
LOS: 0 days | Discharge: HOME | End: 2024-12-17
Attending: EMERGENCY MEDICINE
Payer: MEDICAID

## 2024-12-17 VITALS
RESPIRATION RATE: 20 BRPM | WEIGHT: 166.89 LBS | TEMPERATURE: 98 F | OXYGEN SATURATION: 100 % | SYSTOLIC BLOOD PRESSURE: 133 MMHG | HEIGHT: 60 IN | DIASTOLIC BLOOD PRESSURE: 70 MMHG | HEART RATE: 61 BPM

## 2024-12-17 DIAGNOSIS — R07.9 CHEST PAIN, UNSPECIFIED: ICD-10-CM

## 2024-12-17 DIAGNOSIS — Z98.51 TUBAL LIGATION STATUS: Chronic | ICD-10-CM

## 2024-12-17 DIAGNOSIS — J18.1 LOBAR PNEUMONIA, UNSPECIFIED ORGANISM: ICD-10-CM

## 2024-12-17 DIAGNOSIS — Z98.891 HISTORY OF UTERINE SCAR FROM PREVIOUS SURGERY: Chronic | ICD-10-CM

## 2024-12-17 DIAGNOSIS — R09.81 NASAL CONGESTION: ICD-10-CM

## 2024-12-17 DIAGNOSIS — R05.1 ACUTE COUGH: ICD-10-CM

## 2024-12-17 PROCEDURE — 71046 X-RAY EXAM CHEST 2 VIEWS: CPT

## 2024-12-17 PROCEDURE — 99283 EMERGENCY DEPT VISIT LOW MDM: CPT | Mod: 25

## 2024-12-17 PROCEDURE — 99284 EMERGENCY DEPT VISIT MOD MDM: CPT

## 2024-12-17 PROCEDURE — 93005 ELECTROCARDIOGRAM TRACING: CPT

## 2024-12-17 PROCEDURE — 93010 ELECTROCARDIOGRAM REPORT: CPT

## 2024-12-17 PROCEDURE — 71046 X-RAY EXAM CHEST 2 VIEWS: CPT | Mod: 26

## 2024-12-17 RX ORDER — PSEUDOEPHEDRINE HCL 30 MG
1 TABLET ORAL
Qty: 20 | Refills: 0
Start: 2024-12-17 | End: 2024-12-21

## 2024-12-17 RX ORDER — BENZONATATE 100 MG/1
1 CAPSULE ORAL
Qty: 21 | Refills: 0
Start: 2024-12-17 | End: 2024-12-23

## 2024-12-17 RX ORDER — IBUPROFEN 200 MG
600 TABLET ORAL ONCE
Refills: 0 | Status: COMPLETED | OUTPATIENT
Start: 2024-12-17 | End: 2024-12-17

## 2024-12-17 RX ORDER — IBUPROFEN 200 MG
1 TABLET ORAL
Qty: 20 | Refills: 0
Start: 2024-12-17 | End: 2024-12-21

## 2024-12-17 RX ORDER — DOXYCYCLINE HYCLATE 150 MG/1
1 TABLET, COATED ORAL
Qty: 20 | Refills: 0
Start: 2024-12-17 | End: 2024-12-26

## 2024-12-17 RX ADMIN — Medication 600 MILLIGRAM(S): at 06:29

## 2024-12-17 NOTE — ED PROVIDER NOTE - PHYSICAL EXAMINATION
VITAL SIGNS: I have reviewed nursing notes and confirm.  CONSTITUTIONAL: Well-developed; well-nourished; in no acute distress, well hydrated  SKIN: Skin exam is warm and dry, no acute rash, good turgor  HEAD: Normocephalic; atraumatic.  EYES: PERRL, EOM intact; conjunctiva and sclera clear.  ENT: clear rhinorrhea, edematous turbinates, no pharyngeal erythema  NECK: Supple; non tender, no significant adenopathy  CARD: S1, S2 normal; no murmurs, gallops, or rubs. Regular rate and rhythm.  RESP: No wheezes, rales or rhonchi.  ABD: Normal bowel sounds; soft; non-distended; non-tender  EXT: Normal ROM, no calf swelling, asymmetry or ttp  NEURO: Alert, oriented. Grossly unremarkable. No focal deficits.  PSYCH: Cooperative, appropriate.

## 2024-12-17 NOTE — ED PROVIDER NOTE - CLINICAL SUMMARY MEDICAL DECISION MAKING FREE TEXT BOX
XR suggetive of PNA, possibly atypical, will treat with doxy. Also will treat sx with motrin, tessalon perles, decongestant. No hypoxia, tachypnea to warrant admission. No signs of PTX, patient is PERC negative.    Will dc with meds, sx monitoring, return precautions, follow up.

## 2024-12-17 NOTE — ED PROVIDER NOTE - OBJECTIVE STATEMENT
Healthy 44-year-old female here for assessment of 6 days of cough, congestion and now chest pain with cough.  Patient notes cough is nonproductive, worse at night.  Has pain across chest and back but only with coughing.  No fevers or chills, no vomiting or diarrhea.  Daughter and  have similar symptoms but they started after hers.    No previous cardiac history, patient is a non-smoker.  No family history of premature CAD.

## 2024-12-17 NOTE — ED PROVIDER NOTE - PATIENT'S PREFERRED PRONOUN
"              After Visit Summary   2/13/2018    Edie Spence    MRN: [id number]           Patient Information     Date Of Birth          2015        Visit Information        Provider Department      2/13/2018 9:00 AM Rosendo Mckinney MD [facility] [place]        Today's Diagnoses     Encounter for routine child health examination w/o abnormal findings    -  1      Care Instructions      Preventive Care at the 9 Month Visit  Growth Measurements & Percentiles  Head Circumference: 18\" (45.7 cm) (90 %, Source: WHO (Girls, 0-2 years)) 90 %ile based on WHO (Girls, 0-2 years) head circumference-for-age data using vitals from 2/13/2018.   Weight: 21 lbs 13 oz / 9.89 kg (actual weight) / 92 %ile based on WHO (Girls, 0-2 years) weight-for-age data using vitals from 2/13/2018.   Length: 2' 4.25\" / 71.8 cm 66 %ile based on WHO (Girls, 0-2 years) length-for-age data using vitals from 2/13/2018.   Weight for length: 95 %ile based on WHO (Girls, 0-2 years) weight-for-recumbent length data using vitals from 2/13/2018.    Your baby s next Preventive Check-up will be at 12 months of age.      Development    At this age, your baby may:      Sit well.      Crawl or creep (not all babies crawl).      Pull self up to stand.      Use her fingers to feed.      Imitate sounds and babble (jerry, mama, bababa).      Respond when her name or a familiar object is called.      Understand a few words such as  no-no  or  bye.       Start to understand that an object hidden by a cloth is still there (object permanence).     Feeding Tips      Your baby s appetite will decrease.  She will also drink less formula or breast milk.    Have your baby start to use a sippy cup and start weaning her off the bottle.    Let your child explore finger foods.  It s good if she gets messy.    You can give your baby table foods as long as the foods are soft or cut into small pieces.  Do not give your baby  junk food.     Don t put your baby to " bed with a bottle.    To reduce your child's chance of developing peanut allergy, you can start introducing peanut-containing foods in small amounts around 6 months of age.  If your child has severe eczema, egg allergy or both, consult with your doctor first about possible allergy-testing and introduction of small amounts of peanut-containing foods at 4-6 months old.  Teething      Babies may drool and chew a lot when getting teeth; a teething ring can give comfort.    Gently clean your baby s gums and teeth after each meal.  Use a soft brush or cloth, along with water or a small amount (smaller than a pea) of fluoridated tooth and gum .     Sleep      Your baby should be able to sleep through the night.  If your baby wakes up during the night, she should go back asleep without your help.  You should not take your baby out of the crib if she wakes up during the night.      Start a nighttime routine which may include bathing, brushing teeth and reading.  Be sure to stick with this routine each night.    Give your baby the same safe toy or blanket for comfort.    Teething discomfort may cause problems with your baby s sleep and appetite.       Safety      Put the car seat in the back seat of your vehicle.  Make sure the seat faces the rear window until your child weighs more than 20 pounds and turns 2 years old.    Put bai on all stairways.    Never put hot liquids near table or countertop edges.  Keep your child away from a hot stove, oven and furnace.    Turn your hot water heater to less than 120  F.    If your baby gets a burn, run the affected body part under cold water and call the clinic right away.    Never leave your child alone in the bathtub or near water.  A child can drown in as little as 1 inch of water.    Do not let your baby get small objects such as toys, nuts, coins, hot dog pieces, peanuts, popcorn, raisins or grapes.  These items may cause choking.    Keep all medicines, cleaning supplies  and poisons out of your baby s reach.  You can apply safety latches to cabinets.    Call the poison control center or your health care provider for directions in case your baby swallows poison.  [phone]    Put plastic covers in unused electrical outlets.    Keep windows closed, or be sure they have screens that cannot be pushed out.  Think about installing window guards.         What Your Baby Needs      Your baby will become more independent.  Let your baby explore.    Play with your baby.  She will imitate your actions and sounds.  This is how your baby learns.    Setting consistent limits helps your child to feel confident and secure and know what you expect.  Be consistent with your limits and discipline, even if this makes your baby unhappy at the moment.    Practice saying a calm and firm  no  only when your baby is in danger.  At other times, offer a different choice or another toy for your baby.    Never use physical punishment.    Dental Care      Your pediatric provider will speak with your regarding the need for regular dental appointments for cleanings and check-ups starting when your child s first tooth appears.      Your child may need fluoride supplements if you have well water.    Brush your child s teeth with a small amount (smaller than a pea) of fluoridated tooth paste once daily.       Lab Tests      Hemoglobin and lead levels may be checked.              Follow-ups after your visit        Your next 10 appointments already scheduled     May 07, 2018  9:00 AM CDT   (Arrive by 8:45 AM)   Well Child with Rosendo Mckinney MD   [facility] [place] ([facility] )    [address] [place] [address]   [phone]              Who to contact     If you have questions or need follow up information about today's clinic visit or your schedule please contact [facility] directly at [phone].  Normal or non-critical lab and imaging results will be  "communicated to you by [employer], letter or phone within 4 business days after the clinic has received the results. If you do not hear from us within 7 days, please contact the clinic through [employer] or phone. If you have a critical or abnormal lab result, we will notify you by phone as soon as possible.  Submit refill requests through [employer] or call your pharmacy and they will forward the refill request to us. Please allow 3 business days for your refill to be completed.          Additional Information About Your Visit        Amind Information     Amind lets you send messages to your doctor, view your test results, renew your prescriptions, schedule appointments and more. To sign up, go to [email or web address], contact your [place] clinic or call [phone] during business hours.            Care EveryWhere ID     This is your Care EveryWhere ID. This could be used by other organizations to access your [place] medical records  [id number]        Your Vitals Were     Temperature Height Head Circumference BMI (Body Mass Index)          96.9  F (36.1  C) 2' 4.25\" (0.718 m) 18\" (45.7 cm) 19.22 kg/m2         Blood Pressure from Last 3 Encounters:   No data found for BP    Weight from Last 3 Encounters:   02/13/18 21 lb 13 oz (9.894 kg) (92 %)*   01/07/18 20 lb 11.6 oz (9.4 kg) (90 %)*   11/13/17 18 lb 14 oz (8.562 kg) (87 %)*     * Growth percentiles are based on WHO (Girls, 0-2 years) data.              We Performed the Following     DEVELOPMENTAL TEST, HARRIS     FLU VAC, SPLIT VIRUS IM, 6-35 MO (QUADRIVALENT) [[id number]]     VACCINE ADMINISTRATION, INITIAL        Primary Care Provider Office Phone # Fax #    Rosendo Mckinney [phone]       [facility] [address] [place] [address]        Equal Access to Services     JOSE FREEDMAN : Hadii david skaggso Sofrench, waaxda luqadaha, qaybta kaalmada adeelodiayamamadou, rafy osborne . So [facility] " [phone].    ATENCIÓN: Si prabhakar pierre, tiene a sanon disposición servicios gratuitos de asistencia lingüística. Jenna al [phone].    We comply with applicable federal civil rights laws and [place] laws. We do not discriminate on the basis of race, color, national origin, age, disability, sex, sexual orientation, or gender identity.            Thank you!     Thank you for choosing [facility]  for your care. Our goal is always to provide you with excellent care. Hearing back from our patients is one way we can continue to improve our services. Please take a few minutes to complete the written survey that you may receive in the mail after your visit with us. Thank you!             Your Updated Medication List - Protect others around you: Learn how to safely use, store and throw away your medicines at www.disposemymeds.org.          This list is accurate as of 18  9:57 AM.  Always use your most recent med list.                   Brand Name Dispense Instructions for use Diagnosis    mineral oil-hydrophilic petrolatum     50 g    Apply topically Diaper Change (for diaper rash or dry skin)    Normal  (single liveborn)          Her/She

## 2024-12-17 NOTE — ED ADULT TRIAGE NOTE - CHIEF COMPLAINT QUOTE
Pt reports coughing a lot and her chest hurts only when she coughs, with congestion for about a week.  pt denies persistent chest pain

## 2024-12-17 NOTE — ED PROVIDER NOTE - NSFOLLOWUPINSTRUCTIONS_ED_ALL_ED_FT
Neumonía bacterial    LO QUE NECESITA SABER:    ¿Qué es la neumonía bacteriana?La neumonía bacterial es lloyd infección pulmonar causada por bacterias. Aidee pulmones se inflaman y no pueden funcionar de la manera adecuada. Los gérmenes de la neumonía bacterial se propagan fácilmente cuando lloyd persona infectada tose o estornuda o por medio de contacto físico cercano.  Los pulmones    ¿Qué aumenta mi riesgo de adquirir neumonía bacteriana?    Un resfriado o gripe    Lloyd hospitalización reciente    Uso de lloyd máquina respiradora para ayudarle a respirar    Edad avanzada    Fumar    [place] cantidades de alcohol    Condiciones de idania, riccardo enfermedades del corazón o los pulmones    Un sistema inmunológico débil causado por el VIH, cáncer, o el uso de esteroides  ¿Cuáles son los signos y síntomas de la neumonía bacterial?    Lloyd tos seca o tos con flemas, la cual podría contener cristobal.    Fiebre o escalofríos    Falta de aliento, sibilancia o dolor en el pecho    Sentirse cansado con facilidad    Latidos cardíacos rápidos    Dolor de viviane, dolor muscular, dolor o malestar estomacal  ¿Cómo se diagnostica la neumonía bacterial?Garnett médico le preguntará sobre aidee signos y síntomas y lo examinará. Es posible que usted necesite hacerse alguno de los siguientes estudios:    La oximetría de pulsomide la cantidad de oxígeno en garnett cristobal.  Oxímetro del pulso      Los análisis de sangrese usan para revisar si hay infección.    Lloyd radiografía de tóraxpuede mostrar signos de lloyd infección en aidee pulmones.    Lloyd muestra de mucosidadse colecta y analiza para la presencia de gérmenes que estén causando garnett enfermedad. Le sirve a garnett médico para escoger el mejor medicamento para tratar la infección.  ¿Cómo se trata la neumonía bacterial?El tratamiento depende de lo que causó garnett neumonía bacterial y de la severidad de aidee síntomas. Es posible que usted necesite alguno de los siguientes:    Los antibióticosayudan a tratar lloyd infección bacteriana.    Acetaminofénalivia el dolor y baja la fiebre. Está disponible sin receta médica. Pregunte la cantidad y la frecuencia con que debe tomarlos. Siga las indicaciones. Kristine las etiquetas de todos los demás medicamentos que esté usando para saber si también contienen acetaminofén, o pregunte a garnett médico o farmacéutico. El acetaminofén puede causar daño en el hígado cuando no se lissy de forma correcta.    AINEcomo el ibuprofeno, ayudan a disminuir la inflamación, el dolor y la fiebre. Anni medicamento está disponible con o sin lloyd receta médica. Los SANDRA pueden causar sangrado estomacal o problemas renales en ciertas personas. Si usted lissy un medicamento anticoagulante, siempre pregúntele a garnett médico si los SANDRA son seguros para usted. Siempre kristine la etiqueta de anni medicamento y siga las instrucciones.    Las técnicas para despejar las vías aéreasson ejercicios que ayudan a aflojar la mucosidad para que pueda respirar mejor. Garnett médico le enseñará cómo hacer los ejercicios. Estos ejercicios pueden usarse junto con aparatos o dispositivos que ayudan a disminuir aidee síntomas.    El soporte respiratoriose administra para ayudar con la respiración. Es posible que reciba oxígeno para aumentar el nivel de oxígeno en garnett cristobal. También podría necesitar lloyd máquina que lo ayude a respirar.  ¿Cómo puedo controlar la neumonía bacteriana?    Descanse tanto riccardo sea necesario.Descanse con frecuencia mientras se recupera. Empiece a hacer un poco más día a día.    Mantenga garnett viviane elevada.Es posible que pueda respirar mejor si mantiene la viviane y la parte superior del cuerpo elevadas.  Elevación de la viviane (adulto)      No fume.El tabaquismo aumenta garnett riesgo de contraer neumonía. El fumar impide la pronta recuperación después que usted ha tenido neumonía. Pida información a garnett médico si usted actualmente fuma y necesita ayuda para dejar de fumar. Los cigarrillos electrónicos o el tabaco sin humo igualmente contienen nicotina. Consulte con garnett médico antes de utilizar estos productos. Evite el humo del cigarrillo cuando alguien más está fumando.    [place] líquidos riccardo se le haya indicado.Pregunte cuánto líquido debe ken cada día y cuáles líquidos son los más adecuados para usted. Los líquidos ayudan a disolver la mucosidad, lo cual le facilita la expectoración de secreciones.    Use un humidificador de samuel fríopara aumentar el nivel de humedad en el aire de garnett hogar. [place] podría ayudarle a respirar más fácilmente y al mismo tiempo disminuir la tos.  ¿Qué puedo hacer para evitar la neumonía bacteriana?    Evite la propagación de gérmenes.Lávese las gladys frecuentemente con agua y jabón. Use un gel desinfectante para las gladys cuando no hay jabón ni agua disponible. No se toque los ojos, la nariz o la boca a menos que se haya lavado las gladys sai. Cúbrase la boca al toser. Tosa en un pañuelo desechable o en la manga de garnett camisa para no contagiar los gérmenes con aidee gladys. Si usted está enfermo, manténgase alejado de otras personas lo más que pueda.        Pregunte sobre las vacunas que pudiera necesitar.La vacuna contra la neumonía puede ayudar a reducir el riesgo de padecerla. La vacuna puede recomendarse cada 5 años, a partir de los 65 años. Otras vacunas ayudan a reducir el riesgo de infecciones que pueden llegar a ser graves para lloyd persona con neumonía. Vacúnese contra la gripe todos los años tan pronto riccardo se recomiende, generalmente en septiembre u octubre. Reciba la vacuna contra la COVID-19 y las dosis de refuerzo según las indicaciones. Garnett médico puede indicarle si también debe recibir otras vacunas, y cuándo aplicárselas.    Limite o no consuma bebidas alcohólicas.Las grandes cantidades pueden provocar neumonía. El alcohol también puede provocar o agravar la deshidratación. Las mujeres deberían limitar el consumo de alcohol a 1 bebida por día. Los hombres deberían limitar el consumo de alcohol a 2 tragos al día. Un trago equivale a 12 onzas de cerveza, 5 onzas de vino o 1 onza y ½ de licor.  Llame al número local de emergencias (911 en los [place]) o pídale a alguien que llame si:    Usted está confundido o no puede pensar con claridad.    ¿Cuándo marie buscar atención inmediata?    Usted orina menos o no orina.    Usted expectora cristobal.    Usted tiene más dificultad para respirar o garnett respiración parece más rápida de lo normal.    Garnett corazón o garnett pulso palpitan más de 100 veces en 1 minuto.    Aidee labios o uñas de los dedos se ponen azules.  ¿Cuándo marie llamar a mi médico?    Aidee síntomas siguen igual o empeoran en las 48 horas después de empezar con los antibióticos.    Usted no puede comer o tiene náuseas o vómitos.    Usted tiene preguntas o inquietudes acerca de garnett condición o cuidado.  ACUERDOS SOBRE GARNETT CUIDADO:    Usted tiene el derecho de ayudar a planear garnett cuidado. Aprenda todo lo que pueda sobre garnett condición y riccardo darle tratamiento. Discuta aidee opciones de tratamiento con aidee médicos para decidir el cuidado que usted desea recibir. Usted siempre tiene el derecho de rechazar el tratamiento.

## 2024-12-17 NOTE — ED PROVIDER NOTE - PATIENT PORTAL LINK FT
You can access the FollowMyHealth Patient Portal offered by [facility] by registering at the following website: [email or web address]. By joining [employer]’s FollowMyHealth portal, you will also be able to view your health information using other applications (apps) compatible with our system.

## 2025-01-02 ENCOUNTER — OUTPATIENT (OUTPATIENT)
Dept: OUTPATIENT SERVICES | Facility: HOSPITAL | Age: 45
LOS: 1 days | End: 2025-01-02
Payer: COMMERCIAL

## 2025-01-02 ENCOUNTER — APPOINTMENT (OUTPATIENT)
Dept: INTERNAL MEDICINE | Facility: CLINIC | Age: 45
End: 2025-01-02
Payer: COMMERCIAL

## 2025-01-02 VITALS
TEMPERATURE: 97.2 F | WEIGHT: 169 LBS | OXYGEN SATURATION: 98 % | BODY MASS INDEX: 34.07 KG/M2 | HEART RATE: 72 BPM | DIASTOLIC BLOOD PRESSURE: 76 MMHG | SYSTOLIC BLOOD PRESSURE: 123 MMHG | HEIGHT: 59 IN

## 2025-01-02 DIAGNOSIS — Z00.00 ENCOUNTER FOR GENERAL ADULT MEDICAL EXAMINATION W/OUT ABNORMAL FINDINGS: ICD-10-CM

## 2025-01-02 DIAGNOSIS — Z98.891 HISTORY OF UTERINE SCAR FROM PREVIOUS SURGERY: Chronic | ICD-10-CM

## 2025-01-02 DIAGNOSIS — Z00.00 ENCOUNTER FOR GENERAL ADULT MEDICAL EXAMINATION WITHOUT ABNORMAL FINDINGS: ICD-10-CM

## 2025-01-02 DIAGNOSIS — Z98.51 TUBAL LIGATION STATUS: Chronic | ICD-10-CM

## 2025-01-02 DIAGNOSIS — E78.5 HYPERLIPIDEMIA, UNSPECIFIED: ICD-10-CM

## 2025-01-02 PROCEDURE — 84443 ASSAY THYROID STIM HORMONE: CPT

## 2025-01-02 PROCEDURE — 99396 PREV VISIT EST AGE 40-64: CPT

## 2025-01-02 PROCEDURE — 36415 COLL VENOUS BLD VENIPUNCTURE: CPT

## 2025-01-02 PROCEDURE — 85027 COMPLETE CBC AUTOMATED: CPT

## 2025-01-02 PROCEDURE — 80061 LIPID PANEL: CPT

## 2025-01-02 PROCEDURE — 80053 COMPREHEN METABOLIC PANEL: CPT

## 2025-01-02 PROCEDURE — 82306 VITAMIN D 25 HYDROXY: CPT

## 2025-01-02 PROCEDURE — 83036 HEMOGLOBIN GLYCOSYLATED A1C: CPT

## 2025-01-03 DIAGNOSIS — E78.5 HYPERLIPIDEMIA, UNSPECIFIED: ICD-10-CM

## 2025-01-03 DIAGNOSIS — Z00.00 ENCOUNTER FOR GENERAL ADULT MEDICAL EXAMINATION WITHOUT ABNORMAL FINDINGS: ICD-10-CM

## 2025-01-03 LAB
25(OH)D3 SERPL-MCNC: 17 NG/ML
ALBUMIN SERPL ELPH-MCNC: 4.5 G/DL
ALP BLD-CCNC: 104 U/L
ALT SERPL-CCNC: 68 U/L
AST SERPL-CCNC: 34 U/L
BASOPHILS # BLD AUTO: 0.03 K/UL
BASOPHILS NFR BLD AUTO: 0.4 %
BILIRUB SERPL-MCNC: 0.4 MG/DL
BUN SERPL-MCNC: 10 MG/DL
CALCIUM SERPL-MCNC: 9.8 MG/DL
CHLORIDE SERPL-SCNC: 102 MMOL/L
CHOLEST SERPL-MCNC: 225 MG/DL
CO2 SERPL-SCNC: 23 MMOL/L
CREAT SERPL-MCNC: 0.6 MG/DL
EGFR: 115 ML/MIN/1.73M2
EOSINOPHIL # BLD AUTO: 0.25 K/UL
EOSINOPHIL NFR BLD AUTO: 3.5 %
ESTIMATED AVERAGE GLUCOSE: 120 MG/DL
GLUCOSE SERPL-MCNC: 91 MG/DL
HBA1C MFR BLD HPLC: 5.8 %
HCT VFR BLD CALC: 38.1 %
HDLC SERPL-MCNC: 55 MG/DL
HGB BLD-MCNC: 11.5 G/DL
IMM GRANULOCYTES NFR BLD AUTO: 0.4 %
LDLC SERPL CALC-MCNC: 143 MG/DL
LYMPHOCYTES # BLD AUTO: 1.88 K/UL
LYMPHOCYTES NFR BLD AUTO: 26.1 %
MAN DIFF?: NORMAL
MCHC RBC-ENTMCNC: 25.1 PG
MCHC RBC-ENTMCNC: 30.2 G/DL
MCV RBC AUTO: 83.2 FL
MONOCYTES # BLD AUTO: 0.43 K/UL
MONOCYTES NFR BLD AUTO: 6 %
NEUTROPHILS # BLD AUTO: 4.59 K/UL
NEUTROPHILS NFR BLD AUTO: 63.6 %
NONHDLC SERPL-MCNC: 170 MG/DL
PLATELET # BLD AUTO: 330 K/UL
PMV BLD AUTO: 0 /100 WBCS
POTASSIUM SERPL-SCNC: 4.4 MMOL/L
PROT SERPL-MCNC: 7.6 G/DL
RBC # BLD: 4.58 M/UL
RBC # FLD: 15.3 %
SODIUM SERPL-SCNC: 139 MMOL/L
TRIGL SERPL-MCNC: 137 MG/DL
TSH SERPL-ACNC: 1.24 UIU/ML
WBC # FLD AUTO: 7.21 K/UL

## 2025-01-09 ENCOUNTER — RESULT REVIEW (OUTPATIENT)
Age: 45
End: 2025-01-09

## 2025-01-09 ENCOUNTER — OUTPATIENT (OUTPATIENT)
Dept: OUTPATIENT SERVICES | Facility: HOSPITAL | Age: 45
LOS: 1 days | End: 2025-01-09
Payer: COMMERCIAL

## 2025-01-09 DIAGNOSIS — Z12.31 ENCOUNTER FOR SCREENING MAMMOGRAM FOR MALIGNANT NEOPLASM OF BREAST: ICD-10-CM

## 2025-01-09 DIAGNOSIS — Z98.891 HISTORY OF UTERINE SCAR FROM PREVIOUS SURGERY: Chronic | ICD-10-CM

## 2025-01-09 DIAGNOSIS — Z98.51 TUBAL LIGATION STATUS: Chronic | ICD-10-CM

## 2025-01-09 PROCEDURE — 77067 SCR MAMMO BI INCL CAD: CPT | Mod: 26

## 2025-01-09 PROCEDURE — 77063 BREAST TOMOSYNTHESIS BI: CPT

## 2025-01-09 PROCEDURE — 77067 SCR MAMMO BI INCL CAD: CPT

## 2025-01-09 PROCEDURE — 77063 BREAST TOMOSYNTHESIS BI: CPT | Mod: 26

## 2025-01-10 DIAGNOSIS — Z12.31 ENCOUNTER FOR SCREENING MAMMOGRAM FOR MALIGNANT NEOPLASM OF BREAST: ICD-10-CM

## 2025-03-24 NOTE — ED ADULT NURSE NOTE - NS ED PATIENT SAFETY CONCERN
pt brought in by ems from [facility], pt woke up sob and  hypoxic on 3 L and sating 83. pt now on nonrebreather 15 L sating only 91%. pt as well very lethargic. pt fs 158 with ems, hx dm and copd No

## 2025-04-18 ENCOUNTER — APPOINTMENT (OUTPATIENT)
Dept: OBGYN | Facility: CLINIC | Age: 45
End: 2025-04-18
Payer: COMMERCIAL

## 2025-04-18 ENCOUNTER — OUTPATIENT (OUTPATIENT)
Dept: OUTPATIENT SERVICES | Facility: HOSPITAL | Age: 45
LOS: 1 days | End: 2025-04-18
Payer: COMMERCIAL

## 2025-04-18 VITALS
HEIGHT: 59 IN | BODY MASS INDEX: 34.27 KG/M2 | DIASTOLIC BLOOD PRESSURE: 58 MMHG | WEIGHT: 170 LBS | SYSTOLIC BLOOD PRESSURE: 110 MMHG

## 2025-04-18 DIAGNOSIS — Z98.891 HISTORY OF UTERINE SCAR FROM PREVIOUS SURGERY: Chronic | ICD-10-CM

## 2025-04-18 DIAGNOSIS — Z01.419 ENCOUNTER FOR GYNECOLOGICAL EXAMINATION (GENERAL) (ROUTINE) W/OUT ABNORMAL FINDINGS: ICD-10-CM

## 2025-04-18 DIAGNOSIS — Z01.419 ENCOUNTER FOR GYNECOLOGICAL EXAMINATION (GENERAL) (ROUTINE) WITHOUT ABNORMAL FINDINGS: ICD-10-CM

## 2025-04-18 DIAGNOSIS — Z98.51 TUBAL LIGATION STATUS: Chronic | ICD-10-CM

## 2025-04-18 PROCEDURE — 99396 PREV VISIT EST AGE 40-64: CPT

## 2025-04-18 PROCEDURE — 87624 HPV HI-RISK TYP POOLED RSLT: CPT

## 2025-04-18 PROCEDURE — 99459 PELVIC EXAMINATION: CPT

## 2025-04-18 PROCEDURE — 88142 CYTOPATH C/V THIN LAYER: CPT

## 2025-04-18 PROCEDURE — 99386 PREV VISIT NEW AGE 40-64: CPT

## 2025-04-21 ENCOUNTER — OUTPATIENT (OUTPATIENT)
Dept: OUTPATIENT SERVICES | Facility: HOSPITAL | Age: 45
LOS: 1 days | End: 2025-04-21

## 2025-04-21 DIAGNOSIS — Z98.51 TUBAL LIGATION STATUS: Chronic | ICD-10-CM

## 2025-04-21 DIAGNOSIS — Z01.419 ENCOUNTER FOR GYNECOLOGICAL EXAMINATION (GENERAL) (ROUTINE) WITHOUT ABNORMAL FINDINGS: ICD-10-CM

## 2025-04-21 DIAGNOSIS — Z98.891 HISTORY OF UTERINE SCAR FROM PREVIOUS SURGERY: Chronic | ICD-10-CM

## 2025-04-22 DIAGNOSIS — Z01.419 ENCOUNTER FOR GYNECOLOGICAL EXAMINATION (GENERAL) (ROUTINE) WITHOUT ABNORMAL FINDINGS: ICD-10-CM

## 2025-04-22 LAB — HPV HIGH+LOW RISK DNA PNL CVX: NOT DETECTED

## 2025-04-25 NOTE — H&P PST ADULT - NS MD HP INPLANTS MED DEV
[facility]/Population Health Chart Review & Patient Outreach Details For Medication Adherence Project    Reason for Outreach Encounter: 3rd Party payor non-compliance report ([employer], [employer], C, etc)  2.  Patient Outreach Method: Reviewed Patient Chart  3.   Medication in question: mounjaro   LAST FILLED: 4/14/25 for 28 day supply  Diabetes Medications              metFORMIN (GLUCOPHAGE) 500 MG tablet Take 1 tablet (500 mg total) by mouth 2 (two) times daily with meals.    tirzepatide 7.5 mg/0.5 mL PnIj Inject 7.5 mg into the skin every 7 days.              4.  Reviewed and or Updates Made To: Patient Chart  5. Outreach Outcomes and/or actions taken: Patient filled medication and is on track to be adherent      
None